# Patient Record
Sex: FEMALE | Race: WHITE | NOT HISPANIC OR LATINO | Employment: FULL TIME | URBAN - METROPOLITAN AREA
[De-identification: names, ages, dates, MRNs, and addresses within clinical notes are randomized per-mention and may not be internally consistent; named-entity substitution may affect disease eponyms.]

---

## 2018-06-11 ENCOUNTER — TRANSCRIBE ORDERS (OUTPATIENT)
Dept: ADMINISTRATIVE | Facility: HOSPITAL | Age: 48
End: 2018-06-11

## 2018-06-11 DIAGNOSIS — Z12.31 VISIT FOR SCREENING MAMMOGRAM: Primary | ICD-10-CM

## 2018-06-22 ENCOUNTER — HOSPITAL ENCOUNTER (OUTPATIENT)
Dept: MAMMOGRAPHY | Facility: HOSPITAL | Age: 48
Discharge: HOME | End: 2018-06-22
Admitting: NURSE PRACTITIONER

## 2018-06-22 DIAGNOSIS — Z12.31 VISIT FOR SCREENING MAMMOGRAM: ICD-10-CM

## 2018-06-22 PROCEDURE — 77067 SCR MAMMO BI INCL CAD: CPT | Performed by: RADIOLOGY

## 2018-06-22 PROCEDURE — 77063 BREAST TOMOSYNTHESIS BI: CPT

## 2018-06-22 PROCEDURE — 77063 BREAST TOMOSYNTHESIS BI: CPT | Performed by: RADIOLOGY

## 2018-06-22 PROCEDURE — 77067 SCR MAMMO BI INCL CAD: CPT

## 2019-11-13 ENCOUNTER — HOSPITAL ENCOUNTER (OUTPATIENT)
Dept: GENERAL RADIOLOGY | Facility: HOSPITAL | Age: 49
Discharge: HOME | End: 2019-11-13
Admitting: PHYSICIAN ASSISTANT

## 2019-11-13 ENCOUNTER — TRANSCRIBE ORDERS (OUTPATIENT)
Dept: ADMINISTRATIVE | Facility: HOSPITAL | Age: 49
End: 2019-11-13

## 2019-11-13 DIAGNOSIS — M79.671 PAIN OF MIDFOOT, RIGHT: Primary | ICD-10-CM

## 2019-11-13 PROCEDURE — 73630 X-RAY EXAM OF FOOT: CPT

## 2020-02-03 ENCOUNTER — TRANSCRIBE ORDERS (OUTPATIENT)
Dept: ADMINISTRATIVE | Facility: HOSPITAL | Age: 50
End: 2020-02-03

## 2020-02-03 ENCOUNTER — APPOINTMENT (OUTPATIENT)
Dept: GENERAL RADIOLOGY | Facility: HOSPITAL | Age: 50
End: 2020-02-03

## 2020-02-03 DIAGNOSIS — M54.9 BACK PAIN, UNSPECIFIED BACK LOCATION, UNSPECIFIED BACK PAIN LATERALITY, UNSPECIFIED CHRONICITY: Primary | ICD-10-CM

## 2020-08-20 ENCOUNTER — OFFICE VISIT (OUTPATIENT)
Dept: NEUROSURGERY | Facility: CLINIC | Age: 50
End: 2020-08-20

## 2020-08-20 ENCOUNTER — PREP FOR SURGERY (OUTPATIENT)
Dept: OTHER | Facility: HOSPITAL | Age: 50
End: 2020-08-20

## 2020-08-20 VITALS — TEMPERATURE: 98 F | BODY MASS INDEX: 31.2 KG/M2 | HEIGHT: 67 IN | RESPIRATION RATE: 15 BRPM | WEIGHT: 198.8 LBS

## 2020-08-20 DIAGNOSIS — M51.16 LUMBAR DISC HERNIATION WITH RADICULOPATHY: Primary | ICD-10-CM

## 2020-08-20 DIAGNOSIS — M54.32 SCIATICA OF LEFT SIDE: Primary | ICD-10-CM

## 2020-08-20 DIAGNOSIS — M47.819 FACET ARTHROPATHY: ICD-10-CM

## 2020-08-20 DIAGNOSIS — M51.36 DDD (DEGENERATIVE DISC DISEASE), LUMBAR: ICD-10-CM

## 2020-08-20 PROBLEM — G43.019 COMMON MIGRAINE WITH INTRACTABLE MIGRAINE: Status: ACTIVE | Noted: 2020-08-20

## 2020-08-20 PROBLEM — R51.9 BENIGN HEADACHE: Status: ACTIVE | Noted: 2020-08-20

## 2020-08-20 PROBLEM — G47.00 CANNOT SLEEP: Status: ACTIVE | Noted: 2020-08-20

## 2020-08-20 PROCEDURE — 99204 OFFICE O/P NEW MOD 45 MIN: CPT | Performed by: NEUROLOGICAL SURGERY

## 2020-08-20 RX ORDER — ACETAMINOPHEN 325 MG/1
650 TABLET ORAL ONCE
Status: CANCELLED | OUTPATIENT
Start: 2020-08-20 | End: 2020-08-20

## 2020-08-20 RX ORDER — BUSPIRONE HYDROCHLORIDE 5 MG/1
5 TABLET ORAL 3 TIMES DAILY
COMMUNITY

## 2020-08-20 RX ORDER — CHLORHEXIDINE GLUCONATE 4 G/100ML
SOLUTION TOPICAL
Qty: 120 ML | Refills: 0 | Status: ON HOLD | OUTPATIENT
Start: 2020-08-20 | End: 2020-09-23

## 2020-08-20 RX ORDER — FAMOTIDINE 20 MG/1
20 TABLET, FILM COATED ORAL
Status: CANCELLED | OUTPATIENT
Start: 2020-08-20

## 2020-08-20 RX ORDER — IBUPROFEN 800 MG/1
800 TABLET ORAL ONCE
Status: CANCELLED | OUTPATIENT
Start: 2020-08-20 | End: 2020-08-20

## 2020-08-20 RX ORDER — SODIUM CHLORIDE, SODIUM LACTATE, POTASSIUM CHLORIDE, CALCIUM CHLORIDE 600; 310; 30; 20 MG/100ML; MG/100ML; MG/100ML; MG/100ML
9 INJECTION, SOLUTION INTRAVENOUS CONTINUOUS
Status: CANCELLED | OUTPATIENT
Start: 2020-08-20

## 2020-08-20 RX ORDER — TEMAZEPAM 15 MG/1
15 CAPSULE ORAL NIGHTLY PRN
COMMUNITY

## 2020-08-20 RX ORDER — LAMOTRIGINE 200 MG/1
200 TABLET ORAL DAILY
COMMUNITY

## 2020-08-20 RX ORDER — GABAPENTIN 100 MG/1
100 CAPSULE ORAL DAILY
COMMUNITY
End: 2020-08-24

## 2020-08-20 RX ORDER — LEVOTHYROXINE SODIUM 0.15 MG/1
125 TABLET ORAL DAILY
COMMUNITY
Start: 2014-07-07 | End: 2022-10-04

## 2020-08-20 RX ORDER — CEFAZOLIN SODIUM 2 G/100ML
2 INJECTION, SOLUTION INTRAVENOUS ONCE
Status: CANCELLED | OUTPATIENT
Start: 2020-08-20 | End: 2020-08-20

## 2020-08-20 RX ORDER — BUPROPION HYDROCHLORIDE 300 MG/1
300 TABLET ORAL EVERY MORNING
COMMUNITY
Start: 2014-01-03 | End: 2022-10-04

## 2020-08-20 RX ORDER — HYDROCODONE BITARTRATE AND ACETAMINOPHEN 7.5; 325 MG/1; MG/1
1 TABLET ORAL ONCE
Status: CANCELLED | OUTPATIENT
Start: 2020-08-20 | End: 2020-08-20

## 2020-08-20 RX ORDER — LURASIDONE HYDROCHLORIDE 20 MG/1
20 TABLET, FILM COATED ORAL DAILY
COMMUNITY

## 2020-08-20 NOTE — PROGRESS NOTES
NAME: DALJIT ROMERO   DOS: 2020  : 1970  PCP: Gary Bowie MD    Chief Complaint:    Chief Complaint   Patient presents with   • Low back & left leg pain       History of Present Illness:  49 y.o. female   Is a 49-year-old female in neurosurgical consultation she reports really no issues of prior back pain she is relatively healthy she recently started as a schoolteacher at [employer] and since January of this year she began with insidious back and left leg pain is gotten worse if anything is been plaguing her incessantly she is tried multiple therapies at home ibuprofen and has pain that radiates to the proximal SI joint down the left leg to the anterior shin consistent with L5 radiculitis she denies any other additional weakness or anything on the right leg she continues to work and she is here for evaluation she does have a history of migraines    PMHX  Allergies:  Allergies   Allergen Reactions   • Neosporin [Bacitracin-Polymyxin B] Hives and Swelling     Medications    Current Outpatient Medications:   •  buPROPion XL (WELLBUTRIN XL) 300 MG 24 hr tablet, Take  by mouth., Disp: , Rfl:   •  busPIRone (BUSPAR) 5 MG tablet, Take 5 mg by mouth 3 (Three) Times a Day., Disp: , Rfl:   •  gabapentin (NEURONTIN) 100 MG capsule, Take 100 mg by mouth Daily., Disp: , Rfl:   •  lamoTRIgine (LaMICtal) 200 MG tablet, Take 200 mg by mouth Daily., Disp: , Rfl:   •  levothyroxine (SYNTHROID, LEVOTHROID) 125 MCG tablet, Take  by mouth., Disp: , Rfl:   •  Lurasidone HCl (Latuda) 20 MG tablet tablet, Take 20 mg by mouth Daily., Disp: , Rfl:   •  temazepam (RESTORIL) 15 MG capsule, Take 15 mg by mouth At Night As Needed for Sleep., Disp: , Rfl:   Past Medical History:  History reviewed. No pertinent past medical history.  Past Surgical History:  Past Surgical History:   Procedure Laterality Date   •  SECTION       Social Hx:  Social History     Tobacco Use   • Smoking status: Never Smoker   • Smokeless  tobacco: Never Used   Substance Use Topics   • Alcohol use: Yes   • Drug use: Never     Family Hx:  Family History   Problem Relation Age of Onset   • Breast cancer Paternal Grandmother 60   • Ovarian cancer Neg Hx      Review of Systems:        Review of Systems   Constitutional: Negative for activity change, appetite change, chills, diaphoresis, fatigue, fever and unexpected weight change.   HENT: Negative for congestion, dental problem, drooling, ear discharge, ear pain, facial swelling, hearing loss, mouth sores, nosebleeds, postnasal drip, rhinorrhea, sinus pressure, sneezing, sore throat, tinnitus, trouble swallowing and voice change.    Eyes: Negative for photophobia, pain, discharge, redness, itching and visual disturbance.   Respiratory: Negative for apnea, cough, choking, chest tightness, shortness of breath, wheezing and stridor.    Cardiovascular: Negative for chest pain, palpitations and leg swelling.   Gastrointestinal: Negative for abdominal distention, abdominal pain, anal bleeding, blood in stool, constipation, diarrhea, nausea, rectal pain and vomiting.   Endocrine: Negative for cold intolerance, heat intolerance, polydipsia, polyphagia and polyuria.   Genitourinary: Negative for decreased urine volume, difficulty urinating, dysuria, enuresis, flank pain, frequency, genital sores, hematuria and urgency.   Musculoskeletal: Positive for back pain. Negative for arthralgias, gait problem, joint swelling, myalgias, neck pain and neck stiffness.   Skin: Negative for color change, pallor, rash and wound.   Allergic/Immunologic: Negative for environmental allergies, food allergies and immunocompromised state.   Neurological: Negative for dizziness, tremors, seizures, syncope, facial asymmetry, speech difficulty, weakness, light-headedness, numbness and headaches.   Hematological: Negative for adenopathy. Does not bruise/bleed easily.   Psychiatric/Behavioral: Negative for agitation, behavioral problems,  confusion, decreased concentration, dysphoric mood, hallucinations, self-injury, sleep disturbance and suicidal ideas. The patient is not nervous/anxious and is not hyperactive.    All other systems reviewed and are negative.     I have reviewed this note template and all pertinent parts of the review of systems social, family history, surgical history and medication list      Physical Examination:  Vitals:    08/20/20 1343   Resp: 15   Temp: 98 °F (36.7 °C)      General Appearance:   Well developed, well nourished, well groomed, alert, and cooperative.  Neurological examination:  Neurologic Exam  Vital signs were reviewed and documented in the chart  Patient appeared in good neurologic function with normal comprehension fluent speech  Mood and affect are normal  Sense of smell deferred    Pupils symmetric equally reactive funduscopic exam not visualized   Visual fields intact to confrontation  Extraocular movements intact  Face motor function is symmetric  Facial sensations normal  Hearing intact to finger rub hearing intact to finger rub  Tongue is midline  Palate symmetric  Swallowing normal  Shoulder shrug normal    Muscle bulk and tone normal  5 out of 5 strength no motor drift  Gait normal intact  Negative Romberg  No clonus long tract signs or myelopathy    Reflexes symmetric  No edema noted and extremities skin appears normal    Straight leg raise sign absent on the right positive on the left  No signs of intrinsic hip dysfunction  Back is without any lesions or abnormality  Feet are warm and well perfused        Review of Imaging/DATA:  I reviewed an MRI of her lumbar spine she is got multilevel degenerative disc but interestingly at L4-5 on the left she has an inferiorly extruded sequestered disc fragment there may be a far lateral component  Diagnoses/Plan:    Ms. Howe is a 49 y.o. 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 given the fact she really has not improved that much since January than February and the clear concordance of her symptoms I recommended a left L4-5 lumbar discectomy I explained the results of surgery failure rate need for other procedures etc.    I think she will do well with an LEFT L4-5 lumbar discectomy for inferior disc extrusion

## 2020-08-21 PROBLEM — M54.32 SCIATICA OF LEFT SIDE: Status: ACTIVE | Noted: 2020-08-21

## 2020-08-24 ENCOUNTER — APPOINTMENT (OUTPATIENT)
Dept: PREADMISSION TESTING | Facility: HOSPITAL | Age: 50
End: 2020-08-24

## 2020-08-24 VITALS — BODY MASS INDEX: 31.8 KG/M2 | WEIGHT: 202.6 LBS | HEIGHT: 67 IN

## 2020-08-24 DIAGNOSIS — M54.32 SCIATICA OF LEFT SIDE: ICD-10-CM

## 2020-08-24 LAB
ANION GAP SERPL CALCULATED.3IONS-SCNC: 9 MMOL/L (ref 5–15)
BUN SERPL-MCNC: 11 MG/DL (ref 6–20)
BUN/CREAT SERPL: 10.3 (ref 7–25)
CALCIUM SPEC-SCNC: 9.4 MG/DL (ref 8.6–10.5)
CHLORIDE SERPL-SCNC: 102 MMOL/L (ref 98–107)
CO2 SERPL-SCNC: 29 MMOL/L (ref 22–29)
CREAT SERPL-MCNC: 1.07 MG/DL (ref 0.57–1)
DEPRECATED RDW RBC AUTO: 39 FL (ref 37–54)
ERYTHROCYTE [DISTWIDTH] IN BLOOD BY AUTOMATED COUNT: 11.5 % (ref 12.3–15.4)
GFR SERPL CREATININE-BSD FRML MDRD: 55 ML/MIN/1.73
GLUCOSE SERPL-MCNC: 85 MG/DL (ref 65–99)
HCT VFR BLD AUTO: 41.7 % (ref 34–46.6)
HGB BLD-MCNC: 13.3 G/DL (ref 12–15.9)
MCH RBC QN AUTO: 29.7 PG (ref 26.6–33)
MCHC RBC AUTO-ENTMCNC: 31.9 G/DL (ref 31.5–35.7)
MCV RBC AUTO: 93.1 FL (ref 79–97)
MRSA DNA SPEC QL NAA+PROBE: NEGATIVE
PLATELET # BLD AUTO: 254 10*3/MM3 (ref 140–450)
PMV BLD AUTO: 9.8 FL (ref 6–12)
POTASSIUM SERPL-SCNC: 4.2 MMOL/L (ref 3.5–5.2)
RBC # BLD AUTO: 4.48 10*6/MM3 (ref 3.77–5.28)
SODIUM SERPL-SCNC: 140 MMOL/L (ref 136–145)
WBC # BLD AUTO: 5.47 10*3/MM3 (ref 3.4–10.8)

## 2020-08-24 PROCEDURE — U0002 COVID-19 LAB TEST NON-CDC: HCPCS

## 2020-08-24 PROCEDURE — C9803 HOPD COVID-19 SPEC COLLECT: HCPCS

## 2020-08-24 PROCEDURE — U0004 COV-19 TEST NON-CDC HGH THRU: HCPCS

## 2020-08-24 PROCEDURE — 87641 MR-STAPH DNA AMP PROBE: CPT | Performed by: NEUROLOGICAL SURGERY

## 2020-08-24 PROCEDURE — 85027 COMPLETE CBC AUTOMATED: CPT | Performed by: NEUROLOGICAL SURGERY

## 2020-08-24 PROCEDURE — 36415 COLL VENOUS BLD VENIPUNCTURE: CPT

## 2020-08-24 PROCEDURE — 80048 BASIC METABOLIC PNL TOTAL CA: CPT | Performed by: NEUROLOGICAL SURGERY

## 2020-08-24 RX ORDER — SUMATRIPTAN 50 MG/1
50 TABLET, FILM COATED ORAL ONCE AS NEEDED
COMMUNITY

## 2020-08-24 RX ORDER — LANSOPRAZOLE 30 MG/1
1 CAPSULE, DELAYED RELEASE ORAL DAILY
COMMUNITY
Start: 2020-05-22

## 2020-08-24 RX ORDER — ERGOCALCIFEROL 1.25 MG/1
1 CAPSULE ORAL WEEKLY
COMMUNITY
Start: 2020-05-13 | End: 2022-10-04

## 2020-08-24 RX ORDER — DICLOFENAC POTASSIUM 50 MG/1
1 POWDER, FOR SOLUTION ORAL AS NEEDED
COMMUNITY
Start: 2020-05-26 | End: 2022-10-04

## 2020-08-24 RX ORDER — MEDROXYPROGESTERONE ACETATE 10 MG/1
10 TABLET ORAL DAILY
COMMUNITY
End: 2021-03-29 | Stop reason: SDUPTHER

## 2020-08-24 RX ORDER — ACETAMINOPHEN 500 MG
1000 TABLET ORAL EVERY 6 HOURS PRN
COMMUNITY

## 2020-08-25 ENCOUNTER — ANESTHESIA EVENT (OUTPATIENT)
Dept: PERIOP | Facility: HOSPITAL | Age: 50
End: 2020-08-25

## 2020-08-25 LAB
REF LAB TEST METHOD: NORMAL
SARS-COV-2 RNA RESP QL NAA+PROBE: NOT DETECTED

## 2020-08-25 RX ORDER — SODIUM CHLORIDE, SODIUM LACTATE, POTASSIUM CHLORIDE, CALCIUM CHLORIDE 600; 310; 30; 20 MG/100ML; MG/100ML; MG/100ML; MG/100ML
9 INJECTION, SOLUTION INTRAVENOUS CONTINUOUS
Status: CANCELLED | OUTPATIENT
Start: 2020-08-25

## 2020-08-25 RX ORDER — SODIUM CHLORIDE 0.9 % (FLUSH) 0.9 %
10 SYRINGE (ML) INJECTION EVERY 12 HOURS SCHEDULED
Status: CANCELLED | OUTPATIENT
Start: 2020-08-25

## 2020-08-25 RX ORDER — SODIUM CHLORIDE 0.9 % (FLUSH) 0.9 %
10 SYRINGE (ML) INJECTION AS NEEDED
Status: CANCELLED | OUTPATIENT
Start: 2020-08-25

## 2020-08-25 RX ORDER — FAMOTIDINE 10 MG/ML
20 INJECTION, SOLUTION INTRAVENOUS ONCE
Status: CANCELLED | OUTPATIENT
Start: 2020-08-25 | End: 2020-08-25

## 2020-08-26 ENCOUNTER — APPOINTMENT (OUTPATIENT)
Dept: GENERAL RADIOLOGY | Facility: HOSPITAL | Age: 50
End: 2020-08-26

## 2020-08-26 ENCOUNTER — ANESTHESIA (OUTPATIENT)
Dept: PERIOP | Facility: HOSPITAL | Age: 50
End: 2020-08-26

## 2020-08-26 ENCOUNTER — HOSPITAL ENCOUNTER (OUTPATIENT)
Facility: HOSPITAL | Age: 50
Setting detail: HOSPITAL OUTPATIENT SURGERY
Discharge: HOME | End: 2020-08-26
Attending: NEUROLOGICAL SURGERY | Admitting: ANESTHESIOLOGY

## 2020-08-26 VITALS
SYSTOLIC BLOOD PRESSURE: 114 MMHG | OXYGEN SATURATION: 95 % | HEART RATE: 82 BPM | DIASTOLIC BLOOD PRESSURE: 78 MMHG | RESPIRATION RATE: 18 BRPM | TEMPERATURE: 97.6 F

## 2020-08-26 LAB
B-HCG UR QL: NEGATIVE
INTERNAL NEGATIVE CONTROL: NEGATIVE
INTERNAL POSITIVE CONTROL: POSITIVE
Lab: NORMAL

## 2020-08-26 PROCEDURE — 25010000002 FENTANYL CITRATE (PF) 100 MCG/2ML SOLUTION: Performed by: NURSE ANESTHETIST, CERTIFIED REGISTERED

## 2020-08-26 PROCEDURE — 63030 LAMOT DCMPRN NRV RT 1 LMBR: CPT | Performed by: PHYSICIAN ASSISTANT

## 2020-08-26 PROCEDURE — 25010000002 METHYLPREDNISOLONE PER 40 MG: Performed by: NEUROLOGICAL SURGERY

## 2020-08-26 PROCEDURE — 81025 URINE PREGNANCY TEST: CPT | Performed by: ANESTHESIOLOGY

## 2020-08-26 PROCEDURE — 76000 FLUOROSCOPY <1 HR PHYS/QHP: CPT

## 2020-08-26 PROCEDURE — 25010000002 ONDANSETRON PER 1 MG: Performed by: NURSE ANESTHETIST, CERTIFIED REGISTERED

## 2020-08-26 PROCEDURE — 25010000002 DEXAMETHASONE PER 1 MG: Performed by: NURSE ANESTHETIST, CERTIFIED REGISTERED

## 2020-08-26 PROCEDURE — 63030 LAMOT DCMPRN NRV RT 1 LMBR: CPT | Performed by: NEUROLOGICAL SURGERY

## 2020-08-26 PROCEDURE — 25010000002 NEOSTIGMINE 10 MG/10ML SOLUTION: Performed by: NURSE ANESTHETIST, CERTIFIED REGISTERED

## 2020-08-26 PROCEDURE — 25010000003 CEFAZOLIN PER 500 MG: Performed by: NEUROLOGICAL SURGERY

## 2020-08-26 PROCEDURE — 25010000002 PROPOFOL 10 MG/ML EMULSION: Performed by: NURSE ANESTHETIST, CERTIFIED REGISTERED

## 2020-08-26 PROCEDURE — 25010000003 CEFAZOLIN IN DEXTROSE 2-4 GM/100ML-% SOLUTION: Performed by: PHYSICIAN ASSISTANT

## 2020-08-26 PROCEDURE — 25010000002 PHENYLEPHRINE PER 1 ML: Performed by: NURSE ANESTHETIST, CERTIFIED REGISTERED

## 2020-08-26 PROCEDURE — 25010000003 LIDOCAINE 1 % SOLUTION: Performed by: NURSE ANESTHETIST, CERTIFIED REGISTERED

## 2020-08-26 DEVICE — FLOSEAL HEMOSTATIC MATRIX, 10ML
Type: IMPLANTABLE DEVICE | Status: FUNCTIONAL
Brand: FLOSEAL HEMOSTATIC MATRIX

## 2020-08-26 RX ORDER — HYDROCODONE BITARTRATE AND ACETAMINOPHEN 7.5; 325 MG/1; MG/1
1 TABLET ORAL ONCE
Status: COMPLETED | OUTPATIENT
Start: 2020-08-26 | End: 2020-08-26

## 2020-08-26 RX ORDER — SODIUM CHLORIDE 9 MG/ML
9 INJECTION, SOLUTION INTRAVENOUS CONTINUOUS
Status: DISCONTINUED | OUTPATIENT
Start: 2020-08-26 | End: 2020-08-26 | Stop reason: HOSPADM

## 2020-08-26 RX ORDER — SODIUM CHLORIDE, SODIUM LACTATE, POTASSIUM CHLORIDE, CALCIUM CHLORIDE 600; 310; 30; 20 MG/100ML; MG/100ML; MG/100ML; MG/100ML
9 INJECTION, SOLUTION INTRAVENOUS CONTINUOUS
Status: DISCONTINUED | OUTPATIENT
Start: 2020-08-26 | End: 2020-08-26

## 2020-08-26 RX ORDER — HYDROCODONE BITARTRATE AND ACETAMINOPHEN 5; 325 MG/1; MG/1
1 TABLET ORAL ONCE AS NEEDED
Status: DISCONTINUED | OUTPATIENT
Start: 2020-08-26 | End: 2020-08-26 | Stop reason: HOSPADM

## 2020-08-26 RX ORDER — ONDANSETRON 2 MG/ML
4 INJECTION INTRAMUSCULAR; INTRAVENOUS ONCE AS NEEDED
Status: DISCONTINUED | OUTPATIENT
Start: 2020-08-26 | End: 2020-08-26 | Stop reason: HOSPADM

## 2020-08-26 RX ORDER — PROPOFOL 10 MG/ML
VIAL (ML) INTRAVENOUS AS NEEDED
Status: DISCONTINUED | OUTPATIENT
Start: 2020-08-26 | End: 2020-08-26 | Stop reason: SURG

## 2020-08-26 RX ORDER — LIDOCAINE HYDROCHLORIDE 10 MG/ML
INJECTION, SOLUTION INFILTRATION; PERINEURAL AS NEEDED
Status: DISCONTINUED | OUTPATIENT
Start: 2020-08-26 | End: 2020-08-26 | Stop reason: SURG

## 2020-08-26 RX ORDER — PROMETHAZINE HYDROCHLORIDE 25 MG/1
25 TABLET ORAL ONCE AS NEEDED
Status: DISCONTINUED | OUTPATIENT
Start: 2020-08-26 | End: 2020-08-26 | Stop reason: HOSPADM

## 2020-08-26 RX ORDER — PROMETHAZINE HYDROCHLORIDE 25 MG/1
25 SUPPOSITORY RECTAL ONCE AS NEEDED
Status: DISCONTINUED | OUTPATIENT
Start: 2020-08-26 | End: 2020-08-26 | Stop reason: HOSPADM

## 2020-08-26 RX ORDER — FAMOTIDINE 20 MG/1
20 TABLET, FILM COATED ORAL ONCE
Status: COMPLETED | OUTPATIENT
Start: 2020-08-26 | End: 2020-08-26

## 2020-08-26 RX ORDER — BUPIVACAINE HYDROCHLORIDE 2.5 MG/ML
INJECTION, SOLUTION EPIDURAL; INFILTRATION; INTRACAUDAL AS NEEDED
Status: DISCONTINUED | OUTPATIENT
Start: 2020-08-26 | End: 2020-08-26 | Stop reason: HOSPADM

## 2020-08-26 RX ORDER — HYDROMORPHONE HYDROCHLORIDE 1 MG/ML
0.5 INJECTION, SOLUTION INTRAMUSCULAR; INTRAVENOUS; SUBCUTANEOUS
Status: DISCONTINUED | OUTPATIENT
Start: 2020-08-26 | End: 2020-08-26 | Stop reason: HOSPADM

## 2020-08-26 RX ORDER — MAGNESIUM HYDROXIDE 1200 MG/15ML
LIQUID ORAL AS NEEDED
Status: DISCONTINUED | OUTPATIENT
Start: 2020-08-26 | End: 2020-08-26 | Stop reason: HOSPADM

## 2020-08-26 RX ORDER — FENTANYL CITRATE 50 UG/ML
50 INJECTION, SOLUTION INTRAMUSCULAR; INTRAVENOUS
Status: DISCONTINUED | OUTPATIENT
Start: 2020-08-26 | End: 2020-08-26 | Stop reason: HOSPADM

## 2020-08-26 RX ORDER — ONDANSETRON 2 MG/ML
INJECTION INTRAMUSCULAR; INTRAVENOUS AS NEEDED
Status: DISCONTINUED | OUTPATIENT
Start: 2020-08-26 | End: 2020-08-26 | Stop reason: SURG

## 2020-08-26 RX ORDER — GLYCOPYRROLATE 0.2 MG/ML
INJECTION INTRAMUSCULAR; INTRAVENOUS AS NEEDED
Status: DISCONTINUED | OUTPATIENT
Start: 2020-08-26 | End: 2020-08-26 | Stop reason: SURG

## 2020-08-26 RX ORDER — IBUPROFEN 800 MG/1
800 TABLET ORAL ONCE
Status: COMPLETED | OUTPATIENT
Start: 2020-08-26 | End: 2020-08-26

## 2020-08-26 RX ORDER — DEXAMETHASONE SODIUM PHOSPHATE 4 MG/ML
INJECTION, SOLUTION INTRA-ARTICULAR; INTRALESIONAL; INTRAMUSCULAR; INTRAVENOUS; SOFT TISSUE AS NEEDED
Status: DISCONTINUED | OUTPATIENT
Start: 2020-08-26 | End: 2020-08-26 | Stop reason: SURG

## 2020-08-26 RX ORDER — LIDOCAINE HYDROCHLORIDE 10 MG/ML
0.5 INJECTION, SOLUTION EPIDURAL; INFILTRATION; INTRACAUDAL; PERINEURAL ONCE AS NEEDED
Status: COMPLETED | OUTPATIENT
Start: 2020-08-26 | End: 2020-08-26

## 2020-08-26 RX ORDER — LIDOCAINE HYDROCHLORIDE AND EPINEPHRINE 5; 5 MG/ML; UG/ML
INJECTION, SOLUTION INFILTRATION; PERINEURAL AS NEEDED
Status: DISCONTINUED | OUTPATIENT
Start: 2020-08-26 | End: 2020-08-26 | Stop reason: HOSPADM

## 2020-08-26 RX ORDER — CEFAZOLIN SODIUM 2 G/100ML
2 INJECTION, SOLUTION INTRAVENOUS ONCE
Status: COMPLETED | OUTPATIENT
Start: 2020-08-26 | End: 2020-08-26

## 2020-08-26 RX ORDER — ACETAMINOPHEN 325 MG/1
650 TABLET ORAL ONCE
Status: COMPLETED | OUTPATIENT
Start: 2020-08-26 | End: 2020-08-26

## 2020-08-26 RX ORDER — NEOSTIGMINE METHYLSULFATE 1 MG/ML
INJECTION, SOLUTION INTRAVENOUS AS NEEDED
Status: DISCONTINUED | OUTPATIENT
Start: 2020-08-26 | End: 2020-08-26 | Stop reason: SURG

## 2020-08-26 RX ORDER — SODIUM CHLORIDE, SODIUM LACTATE, POTASSIUM CHLORIDE, CALCIUM CHLORIDE 600; 310; 30; 20 MG/100ML; MG/100ML; MG/100ML; MG/100ML
INJECTION, SOLUTION INTRAVENOUS CONTINUOUS PRN
Status: DISCONTINUED | OUTPATIENT
Start: 2020-08-26 | End: 2020-08-26 | Stop reason: SURG

## 2020-08-26 RX ORDER — ROCURONIUM BROMIDE 10 MG/ML
INJECTION, SOLUTION INTRAVENOUS AS NEEDED
Status: DISCONTINUED | OUTPATIENT
Start: 2020-08-26 | End: 2020-08-26 | Stop reason: SURG

## 2020-08-26 RX ORDER — FENTANYL CITRATE 50 UG/ML
INJECTION, SOLUTION INTRAMUSCULAR; INTRAVENOUS AS NEEDED
Status: DISCONTINUED | OUTPATIENT
Start: 2020-08-26 | End: 2020-08-26 | Stop reason: SURG

## 2020-08-26 RX ORDER — METHYLPREDNISOLONE ACETATE 40 MG/ML
INJECTION, SUSPENSION INTRA-ARTICULAR; INTRALESIONAL; INTRAMUSCULAR; SOFT TISSUE AS NEEDED
Status: DISCONTINUED | OUTPATIENT
Start: 2020-08-26 | End: 2020-08-26 | Stop reason: HOSPADM

## 2020-08-26 RX ADMIN — IBUPROFEN 800 MG: 800 TABLET, FILM COATED ORAL at 10:08

## 2020-08-26 RX ADMIN — PHENYLEPHRINE HYDROCHLORIDE 80 MCG: 10 INJECTION INTRAVENOUS at 13:54

## 2020-08-26 RX ADMIN — LIDOCAINE HYDROCHLORIDE 0.2 ML: 10 INJECTION, SOLUTION EPIDURAL; INFILTRATION; INTRACAUDAL; PERINEURAL at 10:06

## 2020-08-26 RX ADMIN — ACETAMINOPHEN 650 MG: 325 TABLET ORAL at 10:07

## 2020-08-26 RX ADMIN — PHENYLEPHRINE HYDROCHLORIDE 80 MCG: 10 INJECTION INTRAVENOUS at 13:52

## 2020-08-26 RX ADMIN — ROCURONIUM BROMIDE 40 MG: 10 INJECTION INTRAVENOUS at 13:44

## 2020-08-26 RX ADMIN — FENTANYL CITRATE 25 MCG: 50 INJECTION, SOLUTION INTRAMUSCULAR; INTRAVENOUS at 13:44

## 2020-08-26 RX ADMIN — NEOSTIGMINE 4 MG: 1 INJECTION INTRAVENOUS at 14:57

## 2020-08-26 RX ADMIN — HYDROCODONE BITARTRATE AND ACETAMINOPHEN 1 TABLET: 7.5; 325 TABLET ORAL at 10:07

## 2020-08-26 RX ADMIN — SODIUM CHLORIDE 9 ML/HR: 9 INJECTION, SOLUTION INTRAVENOUS at 10:09

## 2020-08-26 RX ADMIN — DEXAMETHASONE SODIUM PHOSPHATE 8 MG: 4 INJECTION, SOLUTION INTRAMUSCULAR; INTRAVENOUS at 13:48

## 2020-08-26 RX ADMIN — GLYCOPYRROLATE 0.6 MG: 0.2 INJECTION INTRAMUSCULAR; INTRAVENOUS at 14:57

## 2020-08-26 RX ADMIN — FENTANYL CITRATE 50 MCG: 0.05 INJECTION, SOLUTION INTRAMUSCULAR; INTRAVENOUS at 15:52

## 2020-08-26 RX ADMIN — PROPOFOL 20 MG: 10 INJECTION, EMULSION INTRAVENOUS at 13:45

## 2020-08-26 RX ADMIN — SODIUM CHLORIDE, POTASSIUM CHLORIDE, SODIUM LACTATE AND CALCIUM CHLORIDE: 600; 310; 30; 20 INJECTION, SOLUTION INTRAVENOUS at 14:57

## 2020-08-26 RX ADMIN — ONDANSETRON 4 MG: 2 INJECTION INTRAMUSCULAR; INTRAVENOUS at 14:49

## 2020-08-26 RX ADMIN — CEFAZOLIN SODIUM 2 G: 2 INJECTION, SOLUTION INTRAVENOUS at 13:35

## 2020-08-26 RX ADMIN — SODIUM CHLORIDE, POTASSIUM CHLORIDE, SODIUM LACTATE AND CALCIUM CHLORIDE: 600; 310; 30; 20 INJECTION, SOLUTION INTRAVENOUS at 13:35

## 2020-08-26 RX ADMIN — PROPOFOL 150 MG: 10 INJECTION, EMULSION INTRAVENOUS at 13:44

## 2020-08-26 RX ADMIN — LIDOCAINE HYDROCHLORIDE 50 MG: 10 INJECTION, SOLUTION INFILTRATION; PERINEURAL at 13:44

## 2020-08-26 RX ADMIN — FAMOTIDINE 20 MG: 20 TABLET, FILM COATED ORAL at 10:07

## 2020-08-26 NOTE — ANESTHESIA POSTPROCEDURE EVALUATION
Patient: Soumya Howe    Procedure Summary     Date:  08/26/20 Room / [facility]:   [facility]    Anesthesia Start:  1335 Anesthesia Stop:  1513    Procedure:  lumbar MICROdiscectomy LEFT L4-5 (Left Spine Lumbar) Diagnosis:       Sciatica of left side      (Sciatica of left side [M54.32])    Surgeon:  Mamadou Estrada MD Provider:  Sam Perry MD    Anesthesia Type:  general ASA Status:  2          Anesthesia Type: general    Vitals  Vitals Value Taken Time   /71 8/26/2020  3:13 PM   Temp 97 °F (36.1 °C) 8/26/2020  3:13 PM   Pulse 96 8/26/2020  3:13 PM   Resp 14 8/26/2020  3:13 PM   SpO2 100 % 8/26/2020  3:13 PM           Post Anesthesia Care and Evaluation    Patient location during evaluation: PACU  Patient participation: complete - patient participated  Level of consciousness: awake and alert  Pain score: 0  Pain management: adequate  Airway patency: patent  Anesthetic complications: No anesthetic complications  PONV Status: none  Cardiovascular status: hemodynamically stable and acceptable  Respiratory status: nonlabored ventilation, acceptable and nasal cannula  Hydration status: acceptable    Comments: Pt transported to PACU with O2 via nasal cannula at 4 L/MIN. Vital signs stable.  Pt shows no signs of distress.  Report given to PACU RN. /71 (BP Location: Right arm)   Pulse 96   Temp 97 °F (36.1 °C) (Temporal)   Resp 14   SpO2 100%   Breastfeeding No Comment: neg Hcg

## 2020-08-26 NOTE — ANESTHESIA PREPROCEDURE EVALUATION
Anesthesia Evaluation     Patient summary reviewed and Nursing notes reviewed   NPO Solid Status: > 8 hours  NPO Liquid Status: > 4 hours           Airway   Mallampati: I  TM distance: >3 FB  Neck ROM: full  Dental      Pulmonary    Cardiovascular     ECG reviewed  Rhythm: regular  Rate: normal        Neuro/Psych  (+) headaches,     GI/Hepatic/Renal/Endo    (+) obesity,       Musculoskeletal     Abdominal    Substance History      OB/GYN          Other        ROS/Med Hx Other: S/p endeometrial ablation                  Anesthesia Plan    ASA 2     general     intravenous induction     Anesthetic plan, all risks, benefits, and alternatives have been provided, discussed and informed consent has been obtained with: patient.    Plan discussed with CRNA.

## 2020-08-26 NOTE — ANESTHESIA PROCEDURE NOTES
Airway  Urgency: elective    Date/Time: 8/26/2020 1:46 PM  Airway not difficult    General Information and Staff    Patient location during procedure: OR  CRNA: Laura Fung CRNA    Indications and Patient Condition  Indications for airway management: airway protection    Preoxygenated: yes  MILS not maintained throughout  Mask difficulty assessment: 1 - vent by mask    Final Airway Details  Final airway type: endotracheal airway      Successful airway: ETT  Cuffed: yes   Successful intubation technique: direct laryngoscopy  Facilitating devices/methods: intubating stylet  Endotracheal tube insertion site: oral  Blade: Gustavo  Blade size: 3  ETT size (mm): 7.0  Cormack-Lehane Classification: grade I - full view of glottis  Placement verified by: chest auscultation and capnometry   Cuff volume (mL): 6  Measured from: teeth  ETT/EBT  to teeth (cm): 21  Number of attempts at approach: 1  Assessment: lips, teeth, and gum same as pre-op and atraumatic intubation    Additional Comments  Smooth IV induction.  Atraumatic ET intubation.  Dentition unchanged.  Negative epigastric sounds, Breath sound equal bilaterally with symmetric chest rise and fall

## 2020-09-15 ENCOUNTER — TELEPHONE (OUTPATIENT)
Dept: NEUROSURGERY | Facility: CLINIC | Age: 50
End: 2020-09-15

## 2020-09-15 RX ORDER — METHYLPREDNISOLONE 4 MG/1
TABLET ORAL
Qty: 21 TABLET | Refills: 0 | Status: SHIPPED | OUTPATIENT
Start: 2020-09-15 | End: 2020-09-22

## 2020-09-15 NOTE — TELEPHONE ENCOUNTER
Patient called in stating that she was doing very well since the surgery, noting all symptoms had resolved.  2 days ago she was sitting on the floor and reaching, and about 1 hour later she started experiencing left leg radicular pain that was similar to prior to surgery.  She states that sitting walking upstairs exacerbates her symptoms.  She notes that she has taken a Norco left over from surgery, Tylenol, ibuprofen, heat and ice without relief.  Therefore, I have called and patient a Medrol Dosepak and have restarted her on gabapentin.  I have notified patient.  She will follow-up on Thursday with Frandy in office.

## 2020-09-17 ENCOUNTER — OFFICE VISIT (OUTPATIENT)
Dept: NEUROSURGERY | Facility: CLINIC | Age: 50
End: 2020-09-17

## 2020-09-17 VITALS — RESPIRATION RATE: 15 BRPM | HEIGHT: 67 IN | TEMPERATURE: 97.5 F | WEIGHT: 202.6 LBS | BODY MASS INDEX: 31.8 KG/M2

## 2020-09-17 DIAGNOSIS — M51.16 LUMBAR DISC HERNIATION WITH RADICULOPATHY: Primary | ICD-10-CM

## 2020-09-17 DIAGNOSIS — M51.36 DDD (DEGENERATIVE DISC DISEASE), LUMBAR: Primary | ICD-10-CM

## 2020-09-17 PROCEDURE — 99024 POSTOP FOLLOW-UP VISIT: CPT | Performed by: PHYSICIAN ASSISTANT

## 2020-09-17 RX ORDER — GABAPENTIN 300 MG/1
300 CAPSULE ORAL 3 TIMES DAILY
Qty: 90 CAPSULE | Refills: 1 | OUTPATIENT
Start: 2020-09-17 | End: 2022-10-04

## 2020-09-17 NOTE — PROGRESS NOTES
"Subjective   Patient ID: Soumya Howe is a 49 y.o. female is here today for follow-up for wound check.    HPI:      Patient did fantastic postop and only took 4 days of pain medicine.  Patient had a left-sided L4-5 microdiscectomy on 8/26/2020.  Patient about 3 days ago sitting on the floor reaching and sorting some piles and went to walk her dog and noticed she had exquisite low back and left leg pain that was similar to prior before surgery.    Patient was started on Medrol Dosepak gabapentin was increased.    Patient comes today which is day 3 of the new pain and she states that she is \"dragging\" her foot.  Patient notices a little bit of weakness in her left foot.    Patient's incision is clean dry no signs infection bleeding erythema.  Patient denies any fever chills or signs of infection    The following portions of the patient's history were reviewed and updated as appropriate: allergies, current medications, past family history, past medical history, past social history, past surgical history and problem list.    Review of Systems   Constitutional: Negative for activity change, appetite change, chills, diaphoresis, fatigue, fever and unexpected weight change.   HENT: Negative for congestion, dental problem, drooling, ear discharge, ear pain, facial swelling, hearing loss, mouth sores, nosebleeds, postnasal drip, rhinorrhea, sinus pressure, sneezing, sore throat, tinnitus, trouble swallowing and voice change.    Eyes: Negative for photophobia, pain, discharge, redness, itching and visual disturbance.   Respiratory: Negative for apnea, cough, choking, chest tightness, shortness of breath, wheezing and stridor.    Cardiovascular: Negative for chest pain, palpitations and leg swelling.   Gastrointestinal: Negative for abdominal distention, abdominal pain, anal bleeding, blood in stool, constipation, diarrhea, nausea, rectal pain and vomiting.   Endocrine: Negative for cold intolerance, heat intolerance, " "polydipsia, polyphagia and polyuria.   Genitourinary: Negative for decreased urine volume, difficulty urinating, dysuria, enuresis, flank pain, frequency, genital sores, hematuria and urgency.   Musculoskeletal: Positive for myalgias (Left leg pain). Negative for arthralgias, back pain, gait problem, joint swelling, neck pain and neck stiffness.   Skin: Negative for color change, pallor, rash and wound.   Allergic/Immunologic: Negative for environmental allergies, food allergies and immunocompromised state.   Neurological: Negative for dizziness, tremors, seizures, syncope, facial asymmetry, speech difficulty, weakness, light-headedness, numbness and headaches.   Hematological: Negative for adenopathy. Does not bruise/bleed easily.   Psychiatric/Behavioral: Negative for agitation, behavioral problems, confusion, decreased concentration, dysphoric mood, hallucinations, self-injury, sleep disturbance and suicidal ideas. The patient is not nervous/anxious and is not hyperactive.    All other systems reviewed and are negative.        Objective    reports that she has never smoked. She has never used smokeless tobacco. She reports previous alcohol use. She reports that she does not use drugs.   SMOKING STATUS: Non-smoker    Physical Exam:   Vitals:Temp 97.5 °F (36.4 °C) (Infrared)   Resp 15   Ht 170.2 cm (67\")   Wt 91.9 kg (202 lb 9.6 oz)   BMI 31.73 kg/m²    BMI: Body mass index is 31.73 kg/m².       Incision:   The incision is well-healed and well approximated.  No signs of infection, bleeding, or erythema.    Musculoskeletal:                                            Dorsiflexion is 5 out of 5 bilaterally, however her great toe is weak on the left and left foot eversion is weaker than right                      Plantarflexion is 5/5 bilaterally                    Hip Flexion 5/5 bilaterally.                        Neurologic:                                         The patient is alert and oriented by 3.          "              Sensation is equal bilaterally with no deficit.                        Reflexes:  2+ throughout     Assessment/Plan   Independent Review of Radiographic Studies:    No new films reviewed at this visi  Medical Decision Making:    To the acute worsening of her pain and increased foot weakness we are going to get a early repeat MRI.  Patient is already on the Medrol Dosepak we will call her on Monday to check her progress.  Patient understands that we are on call this weekend if there are any issues she should call and we will sorted out.  Patient may need a reexploration of the nerve root considering she did not have any foot weakness prior to the surgery.    I will call her on Monday.  We will get the MRI and we will see her back on Thursday to evaluate the strength of her foot.    Patient's Body mass index is 31.73 kg/m². BMI is above normal parameters. Recommendations include: educational material and exercise counseling.    Soumya was seen today for s/p l4-5 discectomy.    Diagnoses and all orders for this visit:    Lumbar disc herniation with radiculopathy  -     MRI Lumbar Spine With & Without Contrast; Future      Return in about 4 days (around 9/21/2020), or Telephone visit.

## 2020-09-21 ENCOUNTER — HOSPITAL ENCOUNTER (OUTPATIENT)
Dept: MRI IMAGING | Facility: HOSPITAL | Age: 50
Discharge: HOME | End: 2020-09-21
Admitting: PHYSICIAN ASSISTANT

## 2020-09-21 ENCOUNTER — OFFICE VISIT (OUTPATIENT)
Dept: NEUROSURGERY | Facility: CLINIC | Age: 50
End: 2020-09-21

## 2020-09-21 ENCOUNTER — TELEPHONE (OUTPATIENT)
Dept: NEUROSURGERY | Facility: CLINIC | Age: 50
End: 2020-09-21

## 2020-09-21 DIAGNOSIS — M51.16 LUMBAR DISC HERNIATION WITH RADICULOPATHY: ICD-10-CM

## 2020-09-21 DIAGNOSIS — M51.16 LUMBAR DISC HERNIATION WITH RADICULOPATHY: Primary | ICD-10-CM

## 2020-09-21 PROCEDURE — 72158 MRI LUMBAR SPINE W/O & W/DYE: CPT

## 2020-09-21 PROCEDURE — A9577 INJ MULTIHANCE: HCPCS | Performed by: PHYSICIAN ASSISTANT

## 2020-09-21 PROCEDURE — 99024 POSTOP FOLLOW-UP VISIT: CPT | Performed by: PHYSICIAN ASSISTANT

## 2020-09-21 PROCEDURE — 0 GADOBENATE DIMEGLUMINE 529 MG/ML SOLUTION: Performed by: PHYSICIAN ASSISTANT

## 2020-09-21 RX ORDER — HYDROCODONE BITARTRATE AND ACETAMINOPHEN 7.5; 325 MG/1; MG/1
1 TABLET ORAL EVERY 6 HOURS PRN
Qty: 40 TABLET | Refills: 0 | Status: SHIPPED | OUTPATIENT
Start: 2020-09-21 | End: 2022-10-04

## 2020-09-21 RX ADMIN — GADOBENATE DIMEGLUMINE 19 ML: 529 INJECTION, SOLUTION INTRAVENOUS at 19:45

## 2020-09-21 NOTE — PROGRESS NOTES
"You have chosen to receive care through a telephone visit. Do you consent to use a telephone visit for your medical care today? Yes    15 minutes    Patient: Soumya Howe  : 1970    Primary Care Provider: Gary Bowie MD      Chief Complaint: Ongoing sciatica with dorsiflexor weakness on left    History of Present Illness:       Patient very sweet 49-year-old female who works for the [employer].  She is a teacher.    She underwent an uncomplicated microdiscectomy on 2020 and had about 3 weeks of pain relief but she was doing some sorting on the floor and noticed resurgence of her low back and left leg pain.  Patient came in last week and was \"dragging her foot due to the dorsiflexor weakness and the pain.  Patient does have a subtle foot drop that is not present prior to the surgery.  This came on along with the pain.    Patient was trialed on some conservative medicines like Medrol Dosepak and this has not given her any relief.  Patient continues to have the foot weakness.    We will get an MRI of her in a semiurgent fashion.  Hopefully we will be able to take the pressure off the nerve for her once again.  This sounds like a recurrent disc herniation left side L4-5.  We will confirm this and likely do a reexploration of the nerve root.    Review of Systems   Constitutional: Negative for activity change, appetite change, chills, fatigue and fever.   HENT: Negative for congestion, dental problem, ear pain, hearing loss, sinus pressure and tinnitus.    Eyes: Negative for pain and redness.   Respiratory: Negative for apnea, cough, shortness of breath and wheezing.    Cardiovascular: Negative for chest pain, palpitations and leg swelling.   Gastrointestinal: Negative for abdominal distention, abdominal pain, blood in stool, constipation, diarrhea, nausea and vomiting.   Endocrine: Negative for cold intolerance, heat intolerance and polyuria.   Genitourinary: Negative for " enuresis, frequency and urgency.   Musculoskeletal: Positive for back pain ( Left leg sciatica).   Skin: Negative for color change and rash.   Neurological: Positive for weakness ( Left dorsiflexor). Negative for dizziness, tremors, seizures, syncope, speech difficulty, light-headedness, numbness and headaches.   Psychiatric/Behavioral: Negative for behavioral problems and confusion. The patient is not nervous/anxious.        Past Medical History:     Past Medical History:   Diagnosis Date   • Bipolar 1 disorder (CMS/HCC)    • Disease of thyroid gland     po meds daily    • GERD (gastroesophageal reflux disease)    • Migraine     last migraine 20       Family History:     Family History   Problem Relation Age of Onset   • Breast cancer Paternal Grandmother 60   • Ovarian cancer Neg Hx        Social History:    reports that she has never smoked. She has never used smokeless tobacco. She reports previous alcohol use. She reports that she does not use drugs.   SMOKING STATUS: Non-smoker    Surgical History:     Past Surgical History:   Procedure Laterality Date   •  SECTION      x2   • ENDOMETRIAL ABLATION     • LAPAROSCOPIC GASTRIC BANDING     • LUMBAR DISCECTOMY Left 2020    Procedure: LUMBAR MICRODISCECTOMY LEFT L4-5;  Surgeon: Mamadou Estrada MD;  [facility]: [facility];  Service: Neurosurgery;  Laterality: Left;   • WISDOM TOOTH EXTRACTION         Allergies:   Other, Neosporin [bacitracin-polymyxin b], and Poison ivy extract    Physical Exam:    Vital Signs:There were no vitals taken for this visit.   BMI: There is no height or weight on file to calculate BMI.     Exam limited secondary to telephone encounter    Medical Decision Making    Data Review:   No new films reviewed at this visit    Diagnosis:   Recurrent left L4-5 disc herniation    Treatment Options:   We are going to proceed with MRI because conservatism has failed.  Patient continues with foot weakness and if we can  decompress this nerve hopefully avoid any permanent damage.    Patient understands the typical relief with her recurrent disc is somewhat less effective than the initial procedure but hopefully will be able to avoid any recurrent discs after the second procedure.    Patient is understanding and wishes to proceed.    Patient's There is no height or weight on file to calculate BMI. BMI is above normal parameters. Recommendations include: educational material and exercise counseling.     Diagnosis Plan   1. Lumbar disc herniation with radiculopathy  HYDROcodone-acetaminophen (NORCO) 7.5-325 MG per tablet    MRI Lumbar Spine With & Without Contrast

## 2020-09-21 NOTE — TELEPHONE ENCOUNTER
Pt had visit with Frandy today VIA telephone visit. RX for Norco on Dr. Estrada's desk for sig.

## 2020-09-22 ENCOUNTER — PREP FOR SURGERY (OUTPATIENT)
Dept: OTHER | Facility: HOSPITAL | Age: 50
End: 2020-09-22

## 2020-09-22 ENCOUNTER — PRIOR AUTHORIZATION (OUTPATIENT)
Dept: NEUROSURGERY | Facility: CLINIC | Age: 50
End: 2020-09-22

## 2020-09-22 ENCOUNTER — APPOINTMENT (OUTPATIENT)
Dept: PREADMISSION TESTING | Facility: HOSPITAL | Age: 50
End: 2020-09-22

## 2020-09-22 ENCOUNTER — DOCUMENTATION (OUTPATIENT)
Dept: NEUROSURGERY | Facility: CLINIC | Age: 50
End: 2020-09-22

## 2020-09-22 ENCOUNTER — ANESTHESIA EVENT (OUTPATIENT)
Dept: PERIOP | Facility: HOSPITAL | Age: 50
End: 2020-09-22

## 2020-09-22 VITALS — WEIGHT: 203 LBS | HEIGHT: 67 IN | BODY MASS INDEX: 31.86 KG/M2

## 2020-09-22 DIAGNOSIS — M53.86 SCIATICA ASSOCIATED WITH DISORDER OF LUMBAR SPINE: ICD-10-CM

## 2020-09-22 DIAGNOSIS — M53.86 SCIATICA ASSOCIATED WITH DISORDER OF LUMBAR SPINE: Primary | ICD-10-CM

## 2020-09-22 PROBLEM — M51.26 RECURRENT HERNIATION OF LUMBAR DISC: Status: ACTIVE | Noted: 2020-09-22

## 2020-09-22 LAB
ANION GAP SERPL CALCULATED.3IONS-SCNC: 8 MMOL/L (ref 5–15)
BUN SERPL-MCNC: 10 MG/DL (ref 6–20)
BUN/CREAT SERPL: 13.7 (ref 7–25)
CALCIUM SPEC-SCNC: 9.6 MG/DL (ref 8.6–10.5)
CHLORIDE SERPL-SCNC: 100 MMOL/L (ref 98–107)
CO2 SERPL-SCNC: 29 MMOL/L (ref 22–29)
CREAT SERPL-MCNC: 0.73 MG/DL (ref 0.57–1)
DEPRECATED RDW RBC AUTO: 41 FL (ref 37–54)
ERYTHROCYTE [DISTWIDTH] IN BLOOD BY AUTOMATED COUNT: 11.9 % (ref 12.3–15.4)
GFR SERPL CREATININE-BSD FRML MDRD: 85 ML/MIN/1.73
GLUCOSE SERPL-MCNC: 76 MG/DL (ref 65–99)
HCT VFR BLD AUTO: 42.6 % (ref 34–46.6)
HGB BLD-MCNC: 13.6 G/DL (ref 12–15.9)
MCH RBC QN AUTO: 29.8 PG (ref 26.6–33)
MCHC RBC AUTO-ENTMCNC: 31.9 G/DL (ref 31.5–35.7)
MCV RBC AUTO: 93.4 FL (ref 79–97)
PLATELET # BLD AUTO: 356 10*3/MM3 (ref 140–450)
PMV BLD AUTO: 9.7 FL (ref 6–12)
POTASSIUM SERPL-SCNC: 4.3 MMOL/L (ref 3.5–5.2)
RBC # BLD AUTO: 4.56 10*6/MM3 (ref 3.77–5.28)
SARS-COV-2 RNA RESP QL NAA+PROBE: NOT DETECTED
SODIUM SERPL-SCNC: 137 MMOL/L (ref 136–145)
WBC # BLD AUTO: 7.86 10*3/MM3 (ref 3.4–10.8)

## 2020-09-22 PROCEDURE — 87635 SARS-COV-2 COVID-19 AMP PRB: CPT | Performed by: NEUROLOGICAL SURGERY

## 2020-09-22 PROCEDURE — 80048 BASIC METABOLIC PNL TOTAL CA: CPT | Performed by: NEUROLOGICAL SURGERY

## 2020-09-22 PROCEDURE — C9803 HOPD COVID-19 SPEC COLLECT: HCPCS

## 2020-09-22 PROCEDURE — 85027 COMPLETE CBC AUTOMATED: CPT | Performed by: NEUROLOGICAL SURGERY

## 2020-09-22 PROCEDURE — 36415 COLL VENOUS BLD VENIPUNCTURE: CPT

## 2020-09-22 RX ORDER — SODIUM CHLORIDE 0.9 % (FLUSH) 0.9 %
10 SYRINGE (ML) INJECTION AS NEEDED
Status: CANCELLED | OUTPATIENT
Start: 2020-09-22

## 2020-09-22 RX ORDER — IBUPROFEN 800 MG/1
800 TABLET ORAL ONCE
Status: CANCELLED | OUTPATIENT
Start: 2020-09-22 | End: 2020-09-22

## 2020-09-22 RX ORDER — CEFAZOLIN SODIUM 2 G/100ML
2 INJECTION, SOLUTION INTRAVENOUS ONCE
Status: CANCELLED | OUTPATIENT
Start: 2020-09-22 | End: 2020-09-22

## 2020-09-22 RX ORDER — GABAPENTIN 300 MG/1
300 CAPSULE ORAL 3 TIMES DAILY
Status: ON HOLD | COMMUNITY
Start: 2020-09-15 | End: 2020-09-23

## 2020-09-22 RX ORDER — ACETAMINOPHEN 325 MG/1
650 TABLET ORAL ONCE
Status: CANCELLED | OUTPATIENT
Start: 2020-09-22 | End: 2020-09-22

## 2020-09-22 RX ORDER — SODIUM CHLORIDE, SODIUM LACTATE, POTASSIUM CHLORIDE, CALCIUM CHLORIDE 600; 310; 30; 20 MG/100ML; MG/100ML; MG/100ML; MG/100ML
9 INJECTION, SOLUTION INTRAVENOUS CONTINUOUS
Status: CANCELLED | OUTPATIENT
Start: 2020-09-22

## 2020-09-22 RX ORDER — HYDROCODONE BITARTRATE AND ACETAMINOPHEN 7.5; 325 MG/1; MG/1
1 TABLET ORAL ONCE
Status: CANCELLED | OUTPATIENT
Start: 2020-09-22 | End: 2020-09-22

## 2020-09-22 RX ORDER — SODIUM CHLORIDE 0.9 % (FLUSH) 0.9 %
10 SYRINGE (ML) INJECTION EVERY 12 HOURS SCHEDULED
Status: CANCELLED | OUTPATIENT
Start: 2020-09-22

## 2020-09-22 RX ORDER — FAMOTIDINE 20 MG/1
20 TABLET, FILM COATED ORAL
Status: CANCELLED | OUTPATIENT
Start: 2020-09-22

## 2020-09-22 RX ORDER — FAMOTIDINE 10 MG/ML
20 INJECTION, SOLUTION INTRAVENOUS ONCE
Status: CANCELLED | OUTPATIENT
Start: 2020-09-22 | End: 2020-09-22

## 2020-09-22 RX ORDER — CHLORHEXIDINE GLUCONATE 4 G/100ML
SOLUTION TOPICAL
Qty: 120 ML | Refills: 0 | Status: ON HOLD | OUTPATIENT
Start: 2020-09-22 | End: 2020-09-23

## 2020-09-22 NOTE — PROGRESS NOTES
Discussed patient's MRIs results was read out as no evidence of significant herniation I feel that there is an inferiorly extruded disc is very consistent with her symptoms she has weakness of the foot will make arrangements for reexploration at the 4 5 level.  She did excellently for about 2 weeks we will try to get her out of her pain is been a pleasure to neurosurgical care

## 2020-09-22 NOTE — TELEPHONE ENCOUNTER
Called and discussed... MRI report says no recurrence, however, I want to look at it with gisselle for final clarification... as soon as I discuss with gisselle I will call her back

## 2020-09-22 NOTE — TELEPHONE ENCOUNTER
Called and advised patient of the PA approval. Patient verbalized understanding. Patient stated she had an MRI done yesterday, ordered by Frandy. Patient wanting to know the results of the MRI, and what [facility] plans are now.   Patient was advised Frandy was in surgery today, and would call her with results/update as soon as he could.

## 2020-09-22 NOTE — TELEPHONE ENCOUNTER
PATIENT CALLED REGARDING RX THAT WAS PICKED UP YESTERDAY. PATIENT ATTEMPTED TO TAKE RX TO PREFERRED PHARMACY TO FILL BUT [facility] IN [place] REFUSED TO FILL & ASK TO SPEAK TO PA DIRECTLY PRIOR TO PROCEEDING. PH: [phone]    PATIENT CAN BE CONTACTED [phone] WITH AN UPDATE

## 2020-09-22 NOTE — TELEPHONE ENCOUNTER
Prior Authorization was approved from 09/22/2020 through 09/22/2021. Patient  Notified and verbalized understanding.

## 2020-09-22 NOTE — TELEPHONE ENCOUNTER
I called and talked with [facility]. Pharmacy stated the patient actually picked this medication up this morning, and used a GoodRx coupon in it.   A prior Authorization was done this morning, and has since then been approved by the patients insurance. Patient

## 2020-09-23 ENCOUNTER — APPOINTMENT (OUTPATIENT)
Dept: GENERAL RADIOLOGY | Facility: HOSPITAL | Age: 50
End: 2020-09-23

## 2020-09-23 ENCOUNTER — HOSPITAL ENCOUNTER (OUTPATIENT)
Facility: HOSPITAL | Age: 50
Discharge: HOME | End: 2020-09-23
Attending: NEUROLOGICAL SURGERY | Admitting: NEUROLOGICAL SURGERY

## 2020-09-23 ENCOUNTER — ANESTHESIA (OUTPATIENT)
Dept: PERIOP | Facility: HOSPITAL | Age: 50
End: 2020-09-23

## 2020-09-23 VITALS
SYSTOLIC BLOOD PRESSURE: 123 MMHG | RESPIRATION RATE: 16 BRPM | HEART RATE: 67 BPM | TEMPERATURE: 97.6 F | OXYGEN SATURATION: 97 % | DIASTOLIC BLOOD PRESSURE: 81 MMHG

## 2020-09-23 DIAGNOSIS — M53.86 SCIATICA ASSOCIATED WITH DISORDER OF LUMBAR SPINE: ICD-10-CM

## 2020-09-23 DIAGNOSIS — M51.26 RECURRENT HERNIATION OF LUMBAR DISC: Primary | ICD-10-CM

## 2020-09-23 PROCEDURE — 25010000003 LIDOCAINE 1 % SOLUTION: Performed by: NURSE ANESTHETIST, CERTIFIED REGISTERED

## 2020-09-23 PROCEDURE — 25010000003 CEFAZOLIN IN DEXTROSE 2-4 GM/100ML-% SOLUTION: Performed by: NEUROLOGICAL SURGERY

## 2020-09-23 PROCEDURE — 76000 FLUOROSCOPY <1 HR PHYS/QHP: CPT

## 2020-09-23 PROCEDURE — 25010000002 FENTANYL CITRATE (PF) 100 MCG/2ML SOLUTION: Performed by: NURSE ANESTHETIST, CERTIFIED REGISTERED

## 2020-09-23 PROCEDURE — 25010000002 ONDANSETRON PER 1 MG: Performed by: NURSE ANESTHETIST, CERTIFIED REGISTERED

## 2020-09-23 PROCEDURE — 81025 URINE PREGNANCY TEST: CPT | Performed by: ANESTHESIOLOGY

## 2020-09-23 PROCEDURE — 63030 LAMOT DCMPRN NRV RT 1 LMBR: CPT | Performed by: PHYSICIAN ASSISTANT

## 2020-09-23 PROCEDURE — 25010000002 DEXAMETHASONE PER 1 MG: Performed by: NURSE ANESTHETIST, CERTIFIED REGISTERED

## 2020-09-23 PROCEDURE — 63030 LAMOT DCMPRN NRV RT 1 LMBR: CPT | Performed by: NEUROLOGICAL SURGERY

## 2020-09-23 PROCEDURE — 25010000003 CEFAZOLIN PER 500 MG: Performed by: NEUROLOGICAL SURGERY

## 2020-09-23 PROCEDURE — 25010000002 PROPOFOL 10 MG/ML EMULSION: Performed by: NURSE ANESTHETIST, CERTIFIED REGISTERED

## 2020-09-23 PROCEDURE — 25010000002 NEOSTIGMINE 10 MG/10ML SOLUTION: Performed by: NURSE ANESTHETIST, CERTIFIED REGISTERED

## 2020-09-23 DEVICE — FLOSEAL HEMOSTATIC MATRIX, 10ML
Type: IMPLANTABLE DEVICE | Status: FUNCTIONAL
Brand: FLOSEAL HEMOSTATIC MATRIX

## 2020-09-23 DEVICE — HEMOST ABS SURGIFOAM SZ100 8X12 10MM: Type: IMPLANTABLE DEVICE | Status: FUNCTIONAL

## 2020-09-23 RX ORDER — TRAMADOL HYDROCHLORIDE 50 MG/1
50 TABLET ORAL EVERY 6 HOURS PRN
Qty: 40 TABLET | Refills: 0 | Status: SHIPPED | OUTPATIENT
Start: 2020-09-23 | End: 2022-10-04

## 2020-09-23 RX ORDER — HYDROCODONE BITARTRATE AND ACETAMINOPHEN 5; 325 MG/1; MG/1
1 TABLET ORAL ONCE AS NEEDED
Status: CANCELLED | OUTPATIENT
Start: 2020-09-23 | End: 2020-10-03

## 2020-09-23 RX ORDER — MIDAZOLAM HYDROCHLORIDE 1 MG/ML
2 INJECTION INTRAMUSCULAR; INTRAVENOUS ONCE
Status: DISCONTINUED | OUTPATIENT
Start: 2020-09-23 | End: 2020-09-23 | Stop reason: HOSPADM

## 2020-09-23 RX ORDER — MAGNESIUM HYDROXIDE 1200 MG/15ML
LIQUID ORAL AS NEEDED
Status: DISCONTINUED | OUTPATIENT
Start: 2020-09-23 | End: 2020-09-23 | Stop reason: HOSPADM

## 2020-09-23 RX ORDER — DEXAMETHASONE SODIUM PHOSPHATE 4 MG/ML
INJECTION, SOLUTION INTRA-ARTICULAR; INTRALESIONAL; INTRAMUSCULAR; INTRAVENOUS; SOFT TISSUE AS NEEDED
Status: DISCONTINUED | OUTPATIENT
Start: 2020-09-23 | End: 2020-09-23 | Stop reason: SURG

## 2020-09-23 RX ORDER — BUPIVACAINE HYDROCHLORIDE 2.5 MG/ML
INJECTION, SOLUTION EPIDURAL; INFILTRATION; INTRACAUDAL AS NEEDED
Status: DISCONTINUED | OUTPATIENT
Start: 2020-09-23 | End: 2020-09-23 | Stop reason: HOSPADM

## 2020-09-23 RX ORDER — ACETAMINOPHEN 325 MG/1
650 TABLET ORAL ONCE
Status: COMPLETED | OUTPATIENT
Start: 2020-09-23 | End: 2020-09-23

## 2020-09-23 RX ORDER — NEOSTIGMINE METHYLSULFATE 1 MG/ML
INJECTION, SOLUTION INTRAVENOUS AS NEEDED
Status: DISCONTINUED | OUTPATIENT
Start: 2020-09-23 | End: 2020-09-23 | Stop reason: SURG

## 2020-09-23 RX ORDER — HYDROCODONE BITARTRATE AND ACETAMINOPHEN 7.5; 325 MG/1; MG/1
1 TABLET ORAL EVERY 6 HOURS PRN
Status: DISCONTINUED | OUTPATIENT
Start: 2020-09-23 | End: 2020-09-24 | Stop reason: HOSPADM

## 2020-09-23 RX ORDER — PROPOFOL 10 MG/ML
VIAL (ML) INTRAVENOUS AS NEEDED
Status: DISCONTINUED | OUTPATIENT
Start: 2020-09-23 | End: 2020-09-23 | Stop reason: SURG

## 2020-09-23 RX ORDER — LIDOCAINE HYDROCHLORIDE AND EPINEPHRINE 5; 5 MG/ML; UG/ML
INJECTION, SOLUTION INFILTRATION; PERINEURAL AS NEEDED
Status: DISCONTINUED | OUTPATIENT
Start: 2020-09-23 | End: 2020-09-23 | Stop reason: HOSPADM

## 2020-09-23 RX ORDER — ROCURONIUM BROMIDE 10 MG/ML
INJECTION, SOLUTION INTRAVENOUS AS NEEDED
Status: DISCONTINUED | OUTPATIENT
Start: 2020-09-23 | End: 2020-09-23 | Stop reason: SURG

## 2020-09-23 RX ORDER — SODIUM CHLORIDE, SODIUM LACTATE, POTASSIUM CHLORIDE, CALCIUM CHLORIDE 600; 310; 30; 20 MG/100ML; MG/100ML; MG/100ML; MG/100ML
INJECTION, SOLUTION INTRAVENOUS CONTINUOUS PRN
Status: DISCONTINUED | OUTPATIENT
Start: 2020-09-23 | End: 2020-09-23 | Stop reason: SURG

## 2020-09-23 RX ORDER — LIDOCAINE HYDROCHLORIDE 10 MG/ML
INJECTION, SOLUTION INFILTRATION; PERINEURAL AS NEEDED
Status: DISCONTINUED | OUTPATIENT
Start: 2020-09-23 | End: 2020-09-23 | Stop reason: SURG

## 2020-09-23 RX ORDER — PROPOFOL 10 MG/ML
VIAL (ML) INTRAVENOUS CONTINUOUS PRN
Status: DISCONTINUED | OUTPATIENT
Start: 2020-09-23 | End: 2020-09-23 | Stop reason: SURG

## 2020-09-23 RX ORDER — GLYCOPYRROLATE 0.2 MG/ML
INJECTION INTRAMUSCULAR; INTRAVENOUS AS NEEDED
Status: DISCONTINUED | OUTPATIENT
Start: 2020-09-23 | End: 2020-09-23 | Stop reason: SURG

## 2020-09-23 RX ORDER — HYDROCODONE BITARTRATE AND ACETAMINOPHEN 7.5; 325 MG/1; MG/1
1 TABLET ORAL ONCE
Status: COMPLETED | OUTPATIENT
Start: 2020-09-23 | End: 2020-09-23

## 2020-09-23 RX ORDER — ONDANSETRON 2 MG/ML
4 INJECTION INTRAMUSCULAR; INTRAVENOUS ONCE AS NEEDED
Status: DISCONTINUED | OUTPATIENT
Start: 2020-09-23 | End: 2020-09-24 | Stop reason: HOSPADM

## 2020-09-23 RX ORDER — HYDROCODONE BITARTRATE AND ACETAMINOPHEN 5; 325 MG/1; MG/1
1-2 TABLET ORAL EVERY 4 HOURS PRN
Qty: 30 TABLET | Refills: 0 | Status: SHIPPED | OUTPATIENT
Start: 2020-09-23 | End: 2022-10-04

## 2020-09-23 RX ORDER — LIDOCAINE HYDROCHLORIDE 10 MG/ML
0.5 INJECTION, SOLUTION EPIDURAL; INFILTRATION; INTRACAUDAL; PERINEURAL ONCE AS NEEDED
Status: COMPLETED | OUTPATIENT
Start: 2020-09-23 | End: 2020-09-23

## 2020-09-23 RX ORDER — IBUPROFEN 800 MG/1
800 TABLET ORAL ONCE
Status: COMPLETED | OUTPATIENT
Start: 2020-09-23 | End: 2020-09-23

## 2020-09-23 RX ORDER — FAMOTIDINE 20 MG/1
20 TABLET, FILM COATED ORAL ONCE
Status: COMPLETED | OUTPATIENT
Start: 2020-09-23 | End: 2020-09-23

## 2020-09-23 RX ORDER — CEFAZOLIN SODIUM 2 G/100ML
2 INJECTION, SOLUTION INTRAVENOUS ONCE
Status: COMPLETED | OUTPATIENT
Start: 2020-09-23 | End: 2020-09-23

## 2020-09-23 RX ORDER — SODIUM CHLORIDE, SODIUM LACTATE, POTASSIUM CHLORIDE, CALCIUM CHLORIDE 600; 310; 30; 20 MG/100ML; MG/100ML; MG/100ML; MG/100ML
9 INJECTION, SOLUTION INTRAVENOUS CONTINUOUS
Status: DISCONTINUED | OUTPATIENT
Start: 2020-09-23 | End: 2020-09-24 | Stop reason: HOSPADM

## 2020-09-23 RX ORDER — ONDANSETRON 2 MG/ML
INJECTION INTRAMUSCULAR; INTRAVENOUS AS NEEDED
Status: DISCONTINUED | OUTPATIENT
Start: 2020-09-23 | End: 2020-09-23 | Stop reason: SURG

## 2020-09-23 RX ORDER — FENTANYL CITRATE 50 UG/ML
50 INJECTION, SOLUTION INTRAMUSCULAR; INTRAVENOUS
Status: DISCONTINUED | OUTPATIENT
Start: 2020-09-23 | End: 2020-09-24 | Stop reason: HOSPADM

## 2020-09-23 RX ADMIN — SODIUM CHLORIDE, POTASSIUM CHLORIDE, SODIUM LACTATE AND CALCIUM CHLORIDE 9 ML/HR: 600; 310; 30; 20 INJECTION, SOLUTION INTRAVENOUS at 07:30

## 2020-09-23 RX ADMIN — ACETAMINOPHEN 650 MG: 325 TABLET ORAL at 07:50

## 2020-09-23 RX ADMIN — EPHEDRINE SULFATE 20 MG: 50 INJECTION INTRAMUSCULAR; INTRAVENOUS; SUBCUTANEOUS at 10:03

## 2020-09-23 RX ADMIN — PROPOFOL 150 MG: 10 INJECTION, EMULSION INTRAVENOUS at 09:31

## 2020-09-23 RX ADMIN — FENTANYL CITRATE 50 MCG: 0.05 INJECTION, SOLUTION INTRAMUSCULAR; INTRAVENOUS at 10:55

## 2020-09-23 RX ADMIN — FAMOTIDINE 20 MG: 20 TABLET, FILM COATED ORAL at 07:51

## 2020-09-23 RX ADMIN — FENTANYL CITRATE 50 MCG: 0.05 INJECTION, SOLUTION INTRAMUSCULAR; INTRAVENOUS at 11:10

## 2020-09-23 RX ADMIN — SODIUM CHLORIDE, POTASSIUM CHLORIDE, SODIUM LACTATE AND CALCIUM CHLORIDE: 600; 310; 30; 20 INJECTION, SOLUTION INTRAVENOUS at 09:27

## 2020-09-23 RX ADMIN — ONDANSETRON 4 MG: 2 INJECTION INTRAMUSCULAR; INTRAVENOUS at 10:19

## 2020-09-23 RX ADMIN — GLYCOPYRROLATE 0.4 MG: 0.2 INJECTION INTRAMUSCULAR; INTRAVENOUS at 10:19

## 2020-09-23 RX ADMIN — ROCURONIUM BROMIDE 50 MG: 10 INJECTION INTRAVENOUS at 09:31

## 2020-09-23 RX ADMIN — PROPOFOL 25 MCG/KG/MIN: 10 INJECTION, EMULSION INTRAVENOUS at 09:43

## 2020-09-23 RX ADMIN — HYDROCODONE BITARTRATE AND ACETAMINOPHEN 1 TABLET: 7.5; 325 TABLET ORAL at 07:51

## 2020-09-23 RX ADMIN — CEFAZOLIN SODIUM 2 G: 2 INJECTION, SOLUTION INTRAVENOUS at 09:30

## 2020-09-23 RX ADMIN — DEXAMETHASONE SODIUM PHOSPHATE 8 MG: 4 INJECTION, SOLUTION INTRAMUSCULAR; INTRAVENOUS at 09:43

## 2020-09-23 RX ADMIN — SODIUM CHLORIDE, POTASSIUM CHLORIDE, SODIUM LACTATE AND CALCIUM CHLORIDE: 600; 310; 30; 20 INJECTION, SOLUTION INTRAVENOUS at 10:18

## 2020-09-23 RX ADMIN — LIDOCAINE HYDROCHLORIDE 50 MG: 10 INJECTION, SOLUTION INFILTRATION; PERINEURAL at 09:31

## 2020-09-23 RX ADMIN — LIDOCAINE HYDROCHLORIDE 0.2 ML: 10 INJECTION, SOLUTION EPIDURAL; INFILTRATION; INTRACAUDAL; PERINEURAL at 07:30

## 2020-09-23 RX ADMIN — IBUPROFEN 800 MG: 800 TABLET, FILM COATED ORAL at 07:50

## 2020-09-23 RX ADMIN — NEOSTIGMINE 3 MG: 1 INJECTION INTRAVENOUS at 10:19

## 2020-09-23 RX ADMIN — HYDROCODONE BITARTRATE AND ACETAMINOPHEN 1 TABLET: 7.5; 325 TABLET ORAL at 11:42

## 2020-09-23 NOTE — ANESTHESIA PROCEDURE NOTES
Airway  Urgency: elective    Date/Time: 9/23/2020 9:32 AM  Airway not difficult    General Information and Staff    Patient location during procedure: OR    Indications and Patient Condition  Indications for airway management: airway protection    Preoxygenated: yes  MILS not maintained throughout  Mask difficulty assessment: 1 - vent by mask    Final Airway Details  Final airway type: endotracheal airway      Successful airway: ETT  Cuffed: yes   Successful intubation technique: direct laryngoscopy  Facilitating devices/methods: intubating stylet  Endotracheal tube insertion site: oral  Blade: [place]  Blade size: 2  ETT size (mm): 7.0  Cormack-Lehane Classification: grade I - full view of glottis  Placement verified by: chest auscultation and capnometry   Measured from: lips  ETT/EBT  to lips (cm): 20  Number of attempts at approach: 1  Assessment: lips, teeth, and gum same as pre-op and atraumatic intubation    Additional Comments  Negative epigastric sounds, Breath sound equal bilaterally with symmetric chest rise and fall.  Teeth intact, atraumatic

## 2020-09-23 NOTE — ANESTHESIA PREPROCEDURE EVALUATION
Anesthesia Evaluation                  Airway   Mallampati: I  TM distance: >3 FB  Neck ROM: full  No difficulty expected  Dental      Pulmonary    Cardiovascular         Neuro/Psych  (+) headaches, numbness, psychiatric history Bipolar,     GI/Hepatic/Renal/Endo    (+)  GERD,      Musculoskeletal     Abdominal    Substance History      OB/GYN          Other                        Anesthesia Plan    ASA 2     general     intravenous induction     Anesthetic plan, all risks, benefits, and alternatives have been provided, discussed and informed consent has been obtained with: patient.    Plan discussed with CRNA.

## 2020-09-23 NOTE — ANESTHESIA POSTPROCEDURE EVALUATION
Patient: Soumya Howe    Procedure Summary     Date: 09/23/20 [facility] / [facility]:  [facility]    Anesthesia Start: 0927 Anesthesia Stop: 1031    Procedure: LUMBAR MICRODISCECTOMY REDO L4-5 (Left Spine Lumbar) Diagnosis:       Sciatica associated with disorder of lumbar spine      (Sciatica associated with disorder of lumbar spine [M53.86])    Surgeon: Mamadou Estrada MD Provider: Vivek Castillo MD    Anesthesia Type: general ASA Status: 2          Anesthesia Type: general    Vitals  Vitals Value Taken Time   /76 09/23/20 1028   Temp 97.6 °F (36.4 °C) 09/23/20 1028   Pulse 60 09/23/20 1028   Resp 16 09/23/20 1028   SpO2 96 % 09/23/20 1028           Post Anesthesia Care and Evaluation    Patient location during evaluation: PACU  Patient participation: complete - patient participated  Level of consciousness: sleepy but conscious  Pain score: 0  Pain management: adequate  Airway patency: patent  Anesthetic complications: No anesthetic complications  PONV Status: none  Cardiovascular status: hemodynamically stable and acceptable  Respiratory status: nonlabored ventilation, acceptable, nasal cannula and oral airway  Hydration status: acceptable

## 2020-09-24 ENCOUNTER — APPOINTMENT (OUTPATIENT)
Dept: MRI IMAGING | Facility: HOSPITAL | Age: 50
End: 2020-09-24

## 2020-10-19 ENCOUNTER — OFFICE VISIT (OUTPATIENT)
Dept: NEUROSURGERY | Facility: CLINIC | Age: 50
End: 2020-10-19

## 2020-10-19 VITALS
TEMPERATURE: 98 F | BODY MASS INDEX: 32.02 KG/M2 | SYSTOLIC BLOOD PRESSURE: 120 MMHG | HEIGHT: 67 IN | DIASTOLIC BLOOD PRESSURE: 84 MMHG | WEIGHT: 204 LBS

## 2020-10-19 DIAGNOSIS — M51.26 RECURRENT HERNIATION OF LUMBAR DISC: ICD-10-CM

## 2020-10-19 DIAGNOSIS — M53.86 SCIATICA ASSOCIATED WITH DISORDER OF LUMBAR SPINE: ICD-10-CM

## 2020-10-19 DIAGNOSIS — M51.36 DDD (DEGENERATIVE DISC DISEASE), LUMBAR: Primary | ICD-10-CM

## 2020-10-19 PROCEDURE — 99024 POSTOP FOLLOW-UP VISIT: CPT | Performed by: PHYSICIAN ASSISTANT

## 2020-10-19 NOTE — PATIENT INSTRUCTIONS
Obesity, Adult  Obesity is the condition of having too much total body fat. Being overweight or obese means that your weight is greater than what is considered healthy for your body size. Obesity is determined by a measurement called BMI. BMI is an estimate of body fat and is calculated from height and weight. For adults, a BMI of 30 or higher is considered obese.  Obesity can lead to other health concerns and major illnesses, including:  · Stroke.  · Coronary artery disease (CAD).  · Type 2 diabetes.  · Some types of cancer, including cancers of the colon, breast, uterus, and gallbladder.  · Osteoarthritis.  · High blood pressure (hypertension).  · High cholesterol.  · Sleep apnea.  · Gallbladder stones.  · Infertility problems.  What are the causes?  Common causes of this condition include:  · Eating daily meals that are high in calories, sugar, and fat.  · Being born with genes that may make you more likely to become obese.  · Having a medical condition that causes obesity, including:  ? Hypothyroidism.  ? Polycystic ovarian syndrome (PCOS).  ? Binge-eating disorder.  ? Cushing syndrome.  · Taking certain medicines, such as steroids, antidepressants, and seizure medicines.  · Not being physically active (sedentary lifestyle).  · Not getting enough sleep.  · Drinking high amounts of sugar-sweetened beverages, such as soft drinks.  What increases the risk?  The following factors may make you more likely to develop this condition:  · Having a family history of obesity.  · Being a woman of  descent.  · Being a man of  descent.  · Living in an area with limited access to:  ? [place], recreation centers, or sidewalks.  ? Healthy food choices, such as grocery stores and farmers' markets.  What are the signs or symptoms?  The main sign of this condition is having too much body fat.  How is this diagnosed?  This condition is diagnosed based on:  · Your BMI. If you are an adult with a BMI of 30 or  higher, you are considered obese.  · Your waist circumference. This measures the distance around your waistline.  · Your skinfold thickness. Your health care provider may gently pinch a fold of your skin and measure it.  You may have other tests to check for underlying conditions.  How is this treated?  Treatment for this condition often includes changing your lifestyle. Treatment may include some or all of the following:  · Dietary changes. This may include developing a healthy meal plan.  · Regular physical activity. This may include activity that causes your heart to beat faster (aerobic exercise) and strength training. Work with your health care provider to design an exercise program that works for you.  · Medicine to help you lose weight if you are unable to lose 1 pound a week after 6 weeks of healthy eating and more physical activity.  · Treating conditions that cause the obesity (underlying conditions).  · Surgery. Surgical options may include gastric banding and gastric bypass. Surgery may be done if:  ? Other treatments have not helped to improve your condition.  ? You have a BMI of 40 or higher.  ? You have life-threatening health problems related to obesity.  Follow these instructions at home:  Eating and drinking    · Follow recommendations from your health care provider about what you eat and drink. Your health care provider may advise you to:  ? Limit fast food, sweets, and processed snack foods.  ? Choose low-fat options, such as low-fat milk instead of whole milk.  ? Eat 5 or more servings of fruits or vegetables every day.  ? Eat at home more often. This gives you more control over what you eat.  ? Choose healthy foods when you eat out.  ? Learn to read food labels. This will help you understand how much food is considered 1 serving.  ? Learn what a healthy serving size is.  ? Keep low-fat snacks available.  ? Limit sugary drinks, such as soda, fruit juice, sweetened iced tea, and flavored  milk.  · Drink enough water to keep your urine pale yellow.  · Do not follow a fad diet. Fad diets can be unhealthy and even dangerous.  Physical activity  · Exercise regularly, as told by your health care provider.  ? Most adults should get up to 150 minutes of moderate-intensity exercise every week.  ? Ask your health care provider what types of exercise are safe for you and how often you should exercise.  · Warm up and stretch before being active.  · Cool down and stretch after being active.  · Rest between periods of activity.  Lifestyle  · Work with your health care provider and a dietitian to set a weight-loss goal that is healthy and reasonable for you.  · Limit your screen time.  · Find ways to reward yourself that do not involve food.  · Do not drink alcohol if:  ? Your health care provider tells you not to drink.  ? You are pregnant, may be pregnant, or are planning to become pregnant.  · If you drink alcohol:  ? Limit how much you use to:  § 0-1 drink a day for women.  § 0-2 drinks a day for men.  ? Be aware of how much alcohol is in your drink. In the [place] one drink equals one 12 oz bottle of beer (355 mL), one 5 oz glass of wine (148 mL), or one 1½ oz glass of hard liquor (44 mL).  General instructions  · Keep a weight-loss journal to keep track of the food you eat and how much exercise you get.  · Take over-the-counter and prescription medicines only as told by your health care provider.  · Take vitamins and supplements only as told by your health care provider.  · Consider joining a support group. Your health care provider may be able to recommend a support group.  · Keep all follow-up visits as told by your health care provider. This is important.  Contact a health care provider if:  · You are unable to meet your weight loss goal after 6 weeks of dietary and lifestyle changes.  Get help right away if you are having:  · Trouble breathing.  · Suicidal thoughts or behaviors.  Summary  · Obesity is the  condition of having too much total body fat.  · Being overweight or obese means that your weight is greater than what is considered healthy for your body size.  · Work with your health care provider and a dietitian to set a weight-loss goal that is healthy and reasonable for you.  · Exercise regularly, as told by your health care provider. Ask your health care provider what types of exercise are safe for you and how often you should exercise.  This information is not intended to replace advice given to you by your health care provider. Make sure you discuss any questions you have with your health care provider.  Document Released: 01/25/2006 Document Revised: 08/22/2019 Document Reviewed: 08/22/2019  Elsevier Patient Education © 2020 [employer]

## 2020-10-19 NOTE — PROGRESS NOTES
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    Patient is well-known to the neurosurgical practice for undergoing a left-sided L4-5 microdiscectomy on 2020.  Patient had a acute and early onset of new symptoms patient did not respond to conservative management and required a second reexploration.    On 2020 patient had a revision discectomy and has been doing well since.    Patient states she only has intermittent pain in the left leg but is much better.  Patient has been very cautious about activities that she is doing at home and has not bending lifting or twisting.    Review of Systems   Constitutional: Negative for activity change, appetite change, chills, diaphoresis, fatigue, fever and unexpected weight change.   HENT: Negative for congestion, dental problem, drooling, ear discharge, ear pain, facial swelling, hearing loss, mouth sores, nosebleeds, postnasal drip, rhinorrhea, sinus pressure, sneezing, sore throat, tinnitus, trouble swallowing and voice change.    Eyes: Negative for photophobia, pain, discharge, redness, itching and visual disturbance.   Respiratory: Negative for apnea, cough, choking, chest tightness, shortness of breath, wheezing and stridor.    Cardiovascular: Negative for chest pain, palpitations and leg swelling.   Gastrointestinal: Negative for abdominal distention, abdominal pain, anal bleeding, blood in stool, constipation, diarrhea, nausea, rectal pain and vomiting.   Musculoskeletal: Negative for arthralgias, back pain, gait problem, joint swelling, myalgias, neck pain and neck stiffness.   Skin: Negative for color change, pallor, rash and wound.   Allergic/Immunologic: Negative for environmental allergies, food allergies and immunocompromised  "state.   Neurological: Negative for dizziness, tremors, seizures, syncope, facial asymmetry, speech difficulty, weakness, light-headedness, numbness and headaches.   Hematological: Negative for adenopathy. Does not bruise/bleed easily.   Psychiatric/Behavioral: Negative for agitation, behavioral problems, confusion, decreased concentration, dysphoric mood, self-injury, sleep disturbance and suicidal ideas. The patient is not nervous/anxious and is not hyperactive.        Past Medical History:     Past Medical History:   Diagnosis Date   • Bipolar 1 disorder (CMS/HCC)    • Disease of thyroid gland     po meds daily    • GERD (gastroesophageal reflux disease)    • Migraine     last migraine 20       Family History:     Family History   Problem Relation Age of Onset   • Breast cancer Paternal Grandmother 60   • Ovarian cancer Neg Hx        Social History:    reports that she has never smoked. She has never used smokeless tobacco. She reports previous alcohol use. She reports that she does not use drugs.   SMOKING STATUS: Non-smoker    Surgical History:     Past Surgical History:   Procedure Laterality Date   •  SECTION      x2   • ENDOMETRIAL ABLATION     • LAPAROSCOPIC GASTRIC BANDING     • LUMBAR DISCECTOMY Left 2020    Procedure: LUMBAR MICRODISCECTOMY LEFT L4-5;  Surgeon: Mamadou Estrada MD;  Location: [facility];  Service: Neurosurgery;  Laterality: Left;   • LUMBAR DISCECTOMY Left 2020    Procedure: LUMBAR MICRODISCECTOMY REDO L4-5 LEFT;  Surgeon: Mamadou Estrada MD;  Location: [facility] OR;  Service: Neurosurgery;  Laterality: Left;   • WISDOM TOOTH EXTRACTION         Allergies:   Other, Neosporin [bacitracin-polymyxin b], and Poison ivy extract    Physical Exam:    Vital Signs:/84   Temp 98 °F (36.7 °C)   Ht 170.2 cm (67\")   Wt 92.5 kg (204 lb)   BMI 31.95 kg/m²    BMI: Body mass index is 31.95 kg/m².       Incision:   The incision is well-healed and well " approximated.  No signs of infection, bleeding, or erythema.    Musculoskeletal:                                            Dorsiflexion is 5/5 Bilaterally                    Plantarflexion is 5/5 bilaterally                    Hip Flexion 5/5 bilaterally.                        Neurologic:                                         The patient is alert and oriented by 3.                       Sensation is equal bilaterally with no deficit.                        Reflexes:  2+ throughout       Medical Decision Making    Data Review:   No new films reviewed at this visit    Diagnosis:   Recurrent left L4-5 disc herniation    Treatment Options:   We are going to continue with physical therapy as well as core strengthening.  I have given her fairly strict instructions about lifting bending and twisting.  Patient should refrain from anything that would strain her back for another 60 days or so.    Patient will see us back in about 6 weeks to ensure she is making continued progress.  Patient is very pleased with surgical outcome.    Patient's Body mass index is 31.95 kg/m². BMI is above normal parameters. Recommendations include: educational material.    No diagnosis found.

## 2020-11-30 ENCOUNTER — OFFICE VISIT (OUTPATIENT)
Dept: NEUROSURGERY | Facility: CLINIC | Age: 50
End: 2020-11-30

## 2020-11-30 VITALS — HEIGHT: 67 IN | RESPIRATION RATE: 15 BRPM | TEMPERATURE: 97 F | WEIGHT: 204.6 LBS | BODY MASS INDEX: 32.11 KG/M2

## 2020-11-30 DIAGNOSIS — M54.32 SCIATICA OF LEFT SIDE: Primary | ICD-10-CM

## 2020-11-30 PROCEDURE — 99024 POSTOP FOLLOW-UP VISIT: CPT | Performed by: PHYSICIAN ASSISTANT

## 2020-11-30 RX ORDER — TOPIRAMATE 50 MG/1
TABLET, FILM COATED ORAL
COMMUNITY
Start: 2020-11-09 | End: 2022-10-04

## 2020-11-30 NOTE — PROGRESS NOTES
Patient: Soumya Howe  : 1970    Primary Care Provider: Gary Bowie MD      Chief Complaint: 6-week follow-up    History of Present Illness:       Patient is a very nice 50-year-old female who works for [employer] as a special .  Patient is well-known to the neurosurgical practice for undergoing a left-sided L4-5 microdiscectomy on 2020.  Patient represented with acute and early onset of new symptoms that did not respond to conservative management and she required a second exploration.  On 2020 patient had a revision discectomy at L4-5    Patient is been doing very well since and having intermittent bouts of pain whenever she is a little overactive.  Patient is very cautious about what she is lifting and doing as far as activities go.  I have suggested to her she maintain a fairly strict no lifting bending twisting until 90 days postop and then she will be about as good as we can get her.      Review of Systems   Constitutional: Negative for activity change, appetite change, chills, diaphoresis, fatigue, fever and unexpected weight change.   HENT: Negative for congestion, dental problem, drooling, ear discharge, ear pain, facial swelling, hearing loss, mouth sores, nosebleeds, postnasal drip, rhinorrhea, sinus pressure, sneezing, sore throat, tinnitus, trouble swallowing and voice change.    Eyes: Negative for photophobia, pain, discharge, redness, itching and visual disturbance.   Respiratory: Negative for apnea, cough, choking, chest tightness, shortness of breath, wheezing and stridor.    Cardiovascular: Negative for chest pain, palpitations and leg swelling.   Gastrointestinal: Negative for abdominal distention, abdominal pain, anal bleeding, blood in stool, constipation, diarrhea, nausea, rectal pain and vomiting.   Endocrine: Negative for cold intolerance, heat intolerance, polydipsia, polyphagia and polyuria.   Genitourinary: Negative for decreased  urine volume, difficulty urinating, dysuria, enuresis, flank pain, frequency, genital sores, hematuria and urgency.   Musculoskeletal: Negative for arthralgias, back pain, gait problem, joint swelling, myalgias, neck pain and neck stiffness.   Skin: Negative for color change, pallor, rash and wound.   Allergic/Immunologic: Negative for environmental allergies, food allergies and immunocompromised state.   Neurological: Negative for dizziness, tremors, seizures, syncope, facial asymmetry, speech difficulty, weakness, light-headedness, numbness and headaches.   Hematological: Negative for adenopathy. Does not bruise/bleed easily.   Psychiatric/Behavioral: Negative for agitation, behavioral problems, confusion, decreased concentration, dysphoric mood, hallucinations, self-injury, sleep disturbance and suicidal ideas. The patient is not nervous/anxious and is not hyperactive.    All other systems reviewed and are negative.      Past Medical History:     Past Medical History:   Diagnosis Date   • Bipolar 1 disorder (CMS/[facility])    • Disease of thyroid gland     po meds daily    • GERD (gastroesophageal reflux disease)    • Migraine     last migraine 21       Family History:     Family History   Problem Relation Age of Onset   • Breast cancer Paternal Grandmother 60   • Ovarian cancer Neg Hx        Social History:    reports that she has never smoked. She has never used smokeless tobacco. She reports previous alcohol use. She reports that she does not use drugs.   SMOKING STATUS: Non-smoker    Surgical History:     Past Surgical History:   Procedure Laterality Date   •  SECTION      x2   • ENDOMETRIAL ABLATION     • LAPAROSCOPIC GASTRIC BANDING     • LUMBAR DISCECTOMY Left 2020    Procedure: LUMBAR MICRODISCECTOMY LEFT L4-5;  Surgeon: Mamadou Estrada MD;  Location: [facility];  Service: Neurosurgery;  Laterality: Left;   • LUMBAR DISCECTOMY Left 2020    Procedure: LUMBAR MICRODISCECTOMY  "REDO L4-5 LEFT;  Surgeon: Mamadou Estrada MD;  Location: [facility];  Service: Neurosurgery;  Laterality: Left;   • WISDOM TOOTH EXTRACTION         Allergies:   Other, Neosporin [bacitracin-polymyxin b], and Poison ivy extract    Physical Exam:    Vital Signs:Temp 97 °F (36.1 °C)   Resp 15   Ht 170.2 cm (67\")   Wt 92.8 kg (204 lb 9.6 oz)   BMI 32.04 kg/m²    BMI: Body mass index is 32.04 kg/m².       Incision:   The incision is well-healed and well approximated.  No signs of infection, bleeding, or erythema.    Musculoskeletal:                                            Dorsiflexion is 5/5 Bilaterally                    Plantarflexion is 5/5 bilaterally                    Hip Flexion 5/5 bilaterally.                        Neurologic:                                         The patient is alert and oriented by 3.                       Sensation is equal bilaterally with no deficit.                        Reflexes:  2+ throughout       Medical Decision Making    Data Review:   No new films reviewed at this visit    Diagnosis:   L4-5 disc herniation   Left L4-5 microdiscectomy x2    Treatment Options:   At this point the patient is doing very well.  The patient is pleased with postsurgical outcome.  With the resolution of pain, I believe that it is adequate to see the patient back on an as-needed basis.  The patient has been educated on reasons that would necessitate a referral back to us in a more urgent manner.  The patient understands of his instructions and limitations for the best post-operative success.    It has been a pleasure providing neurosurgical care.    Frandy Umana PA-C    Patient's Body mass index is 32.04 kg/m². BMI is above normal parameters. Recommendations include: educational material.     Diagnosis Plan   1. Sciatica of left side       "

## 2020-11-30 NOTE — PATIENT INSTRUCTIONS
Obesity, Adult  Obesity is the condition of having too much total body fat. Being overweight or obese means that your weight is greater than what is considered healthy for your body size. Obesity is determined by a measurement called BMI. BMI is an estimate of body fat and is calculated from height and weight. For adults, a BMI of 30 or higher is considered obese.  Obesity can lead to other health concerns and major illnesses, including:  · Stroke.  · Coronary artery disease (CAD).  · Type 2 diabetes.  · Some types of cancer, including cancers of the colon, breast, uterus, and gallbladder.  · Osteoarthritis.  · High blood pressure (hypertension).  · High cholesterol.  · Sleep apnea.  · Gallbladder stones.  · Infertility problems.  What are the causes?  Common causes of this condition include:  · Eating daily meals that are high in calories, sugar, and fat.  · Being born with genes that may make you more likely to become obese.  · Having a medical condition that causes obesity, including:  ? Hypothyroidism.  ? Polycystic ovarian syndrome (PCOS).  ? Binge-eating disorder.  ? Cushing syndrome.  · Taking certain medicines, such as steroids, antidepressants, and seizure medicines.  · Not being physically active (sedentary lifestyle).  · Not getting enough sleep.  · Drinking high amounts of sugar-sweetened beverages, such as soft drinks.  What increases the risk?  The following factors may make you more likely to develop this condition:  · Having a family history of obesity.  · Being a woman of  descent.  · Being a man of  descent.  · Living in an area with limited access to:  ? [place], recreation centers, or sidewalks.  ? Healthy food choices, such as grocery stores and farmers' markets.  What are the signs or symptoms?  The main sign of this condition is having too much body fat.  How is this diagnosed?  This condition is diagnosed based on:  · Your BMI. If you are an adult with a BMI of 30 or  higher, you are considered obese.  · Your waist circumference. This measures the distance around your waistline.  · Your skinfold thickness. Your health care provider may gently pinch a fold of your skin and measure it.  You may have other tests to check for underlying conditions.  How is this treated?  Treatment for this condition often includes changing your lifestyle. Treatment may include some or all of the following:  · Dietary changes. This may include developing a healthy meal plan.  · Regular physical activity. This may include activity that causes your heart to beat faster (aerobic exercise) and strength training. Work with your health care provider to design an exercise program that works for you.  · Medicine to help you lose weight if you are unable to lose 1 pound a week after 6 weeks of healthy eating and more physical activity.  · Treating conditions that cause the obesity (underlying conditions).  · Surgery. Surgical options may include gastric banding and gastric bypass. Surgery may be done if:  ? Other treatments have not helped to improve your condition.  ? You have a BMI of 40 or higher.  ? You have life-threatening health problems related to obesity.  Follow these instructions at home:  Eating and drinking    · Follow recommendations from your health care provider about what you eat and drink. Your health care provider may advise you to:  ? Limit fast food, sweets, and processed snack foods.  ? Choose low-fat options, such as low-fat milk instead of whole milk.  ? Eat 5 or more servings of fruits or vegetables every day.  ? Eat at home more often. This gives you more control over what you eat.  ? Choose healthy foods when you eat out.  ? Learn to read food labels. This will help you understand how much food is considered 1 serving.  ? Learn what a healthy serving size is.  ? Keep low-fat snacks available.  ? Limit sugary drinks, such as soda, fruit juice, sweetened iced tea, and flavored  milk.  · Drink enough water to keep your urine pale yellow.  · Do not follow a fad diet. Fad diets can be unhealthy and even dangerous.  Physical activity  · Exercise regularly, as told by your health care provider.  ? Most adults should get up to 150 minutes of moderate-intensity exercise every week.  ? Ask your health care provider what types of exercise are safe for you and how often you should exercise.  · Warm up and stretch before being active.  · Cool down and stretch after being active.  · Rest between periods of activity.  Lifestyle  · Work with your health care provider and a dietitian to set a weight-loss goal that is healthy and reasonable for you.  · Limit your screen time.  · Find ways to reward yourself that do not involve food.  · Do not drink alcohol if:  ? Your health care provider tells you not to drink.  ? You are pregnant, may be pregnant, or are planning to become pregnant.  · If you drink alcohol:  ? Limit how much you use to:  § 0-1 drink a day for women.  § 0-2 drinks a day for men.  ? Be aware of how much alcohol is in your drink. In the [place] one drink equals one 12 oz bottle of beer (355 mL), one 5 oz glass of wine (148 mL), or one 1½ oz glass of hard liquor (44 mL).  General instructions  · Keep a weight-loss journal to keep track of the food you eat and how much exercise you get.  · Take over-the-counter and prescription medicines only as told by your health care provider.  · Take vitamins and supplements only as told by your health care provider.  · Consider joining a support group. Your health care provider may be able to recommend a support group.  · Keep all follow-up visits as told by your health care provider. This is important.  Contact a health care provider if:  · You are unable to meet your weight loss goal after 6 weeks of dietary and lifestyle changes.  Get help right away if you are having:  · Trouble breathing.  · Suicidal thoughts or behaviors.  Summary  · Obesity is the  condition of having too much total body fat.  · Being overweight or obese means that your weight is greater than what is considered healthy for your body size.  · Work with your health care provider and a dietitian to set a weight-loss goal that is healthy and reasonable for you.  · Exercise regularly, as told by your health care provider. Ask your health care provider what types of exercise are safe for you and how often you should exercise.  This information is not intended to replace advice given to you by your health care provider. Make sure you discuss any questions you have with your health care provider.  Document Revised: 08/22/2019 Document Reviewed: 08/22/2019  Elsevier Patient Education © 2020 [employer]

## 2020-12-29 ENCOUNTER — TRANSCRIBE ORDERS (OUTPATIENT)
Dept: ADMINISTRATIVE | Facility: HOSPITAL | Age: 50
End: 2020-12-29

## 2020-12-29 DIAGNOSIS — Z12.31 VISIT FOR SCREENING MAMMOGRAM: Primary | ICD-10-CM

## 2021-03-24 ENCOUNTER — APPOINTMENT (OUTPATIENT)
Dept: MAMMOGRAPHY | Facility: HOSPITAL | Age: 51
End: 2021-03-24

## 2021-03-29 RX ORDER — MEDROXYPROGESTERONE ACETATE 10 MG/1
10 TABLET ORAL DAILY
Qty: 90 TABLET | Refills: 1 | Status: SHIPPED | OUTPATIENT
Start: 2021-03-29 | End: 2021-08-20 | Stop reason: SDUPTHER

## 2021-04-01 ENCOUNTER — HOSPITAL ENCOUNTER (OUTPATIENT)
Dept: MAMMOGRAPHY | Facility: HOSPITAL | Age: 51
Discharge: HOME | End: 2021-04-01
Admitting: INTERNAL MEDICINE

## 2021-04-01 DIAGNOSIS — Z12.31 VISIT FOR SCREENING MAMMOGRAM: ICD-10-CM

## 2021-04-01 PROCEDURE — 77063 BREAST TOMOSYNTHESIS BI: CPT

## 2021-04-01 PROCEDURE — 77067 SCR MAMMO BI INCL CAD: CPT | Performed by: RADIOLOGY

## 2021-04-01 PROCEDURE — 77067 SCR MAMMO BI INCL CAD: CPT

## 2021-04-01 PROCEDURE — 77063 BREAST TOMOSYNTHESIS BI: CPT | Performed by: RADIOLOGY

## 2021-04-12 ENCOUNTER — TELEPHONE (OUTPATIENT)
Dept: NEUROSURGERY | Facility: CLINIC | Age: 51
End: 2021-04-12

## 2021-04-12 DIAGNOSIS — M53.86 SCIATICA ASSOCIATED WITH DISORDER OF LUMBAR SPINE: Primary | ICD-10-CM

## 2021-04-12 RX ORDER — METHYLPREDNISOLONE 4 MG/1
TABLET ORAL
Qty: 1 EACH | Refills: 0 | Status: SHIPPED | OUTPATIENT
Start: 2021-04-12 | End: 2022-10-04

## 2021-04-12 NOTE — TELEPHONE ENCOUNTER
Call patient she is having similar pain as before but less severe.  Patient was attacked by dog and had to wrestle her dog out from underneath the boxer and the boxer was jumping on her.  A lot happen and she is really unsure when she started having the pain but woke up the next morning with recurrent pain.  It is less severe than previously but she wanted to call to check on this.    Put on steroid pack and she will call on Thursday to let us know how she is doing.

## 2021-04-12 NOTE — TELEPHONE ENCOUNTER
"    Caller: DALJIT ROMERO    Relationship to patient: PT/SELF    Best call back number: [phone]    Chief complaint: PT WAS \"ATTACKED\" BY A BOXER THIS WEEKEND AND IS HAVING SEVERE LOW BACK PAIN WHERE SHE HAD HER PREVIOUS SURGERY.  PT WAS NOT SEEN IN ER BUT HAS TO WATCH THE WAY SHE MOVES.    CURRENT PAIN LEVEL: 7/10    PT HAD PAIN MEDICATION LEFT OVER FROM PREVIOUS SURGERY BUT PT IS UNSURE OF THE MEDICATION. STATES SHE WILL BE OUT THIS EVENING OR TOMORROW MORNING.        Type of visit: FOLLOW UP    Requested date: ASAP    If rescheduling, when is the original appointment: PT WAS LAST SEEN 11/30/2020.    Additional notes: PLEASE CONTACT PT REGARDING THIS MATTER, QUESTIONS OR CONCERNS.    THANK YOU!          "

## 2021-04-19 RX ORDER — METHOCARBAMOL 750 MG/1
750 TABLET, FILM COATED ORAL 3 TIMES DAILY
Qty: 60 TABLET | Refills: 0 | Status: SHIPPED | OUTPATIENT
Start: 2021-04-19 | End: 2022-10-04

## 2021-04-19 NOTE — TELEPHONE ENCOUNTER
Spoke with patient. She states that her pain somewhat improved with the steroids, however still worsens with a lot of twisting movement.  I have called a muscle relaxer in for her.  I advised her to begin taking Tylenol and ibuprofen together when she is finished with the steroid pack.  She will call us in a couple of days if symptoms persist.  At that time we can get her in for an office visit if needed.

## 2021-04-19 NOTE — TELEPHONE ENCOUNTER
Caller: Soumya Howe    Relationship to patient: Self    Best call back number: [phone]    Patient is needing: PT CALLED AND STATED SHE RECEIVED HER MEDICATION ON Thursday. PT WOULD LIKE CARRIE SARKRA TO CALL HER BACK IN REGARDS TO THE LAST MESSAGE TO FOLLOW UP PLEASE ADVISE THANK YOU!

## 2021-08-20 ENCOUNTER — OFFICE VISIT (OUTPATIENT)
Dept: OBSTETRICS AND GYNECOLOGY | Facility: CLINIC | Age: 51
End: 2021-08-20

## 2021-08-20 VITALS
BODY MASS INDEX: 31.74 KG/M2 | DIASTOLIC BLOOD PRESSURE: 80 MMHG | WEIGHT: 202.2 LBS | HEIGHT: 67 IN | SYSTOLIC BLOOD PRESSURE: 122 MMHG

## 2021-08-20 DIAGNOSIS — Z01.419 WOMEN'S ANNUAL ROUTINE GYNECOLOGICAL EXAMINATION: Primary | ICD-10-CM

## 2021-08-20 PROCEDURE — 99396 PREV VISIT EST AGE 40-64: CPT | Performed by: OBSTETRICS & GYNECOLOGY

## 2021-08-20 RX ORDER — TOPIRAMATE 100 MG/1
100 TABLET, FILM COATED ORAL 2 TIMES DAILY
COMMUNITY
Start: 2021-06-23

## 2021-08-20 RX ORDER — LEVOTHYROXINE SODIUM 137 UG/1
137 TABLET ORAL DAILY
COMMUNITY
Start: 2021-06-16

## 2021-08-20 RX ORDER — FINASTERIDE 5 MG/1
TABLET, FILM COATED ORAL
COMMUNITY
Start: 2021-07-21 | End: 2022-10-04

## 2021-08-20 RX ORDER — BUPROPION HYDROCHLORIDE 100 MG/1
100 TABLET, EXTENDED RELEASE ORAL 2 TIMES DAILY
COMMUNITY
Start: 2021-06-29

## 2021-08-20 RX ORDER — LAMOTRIGINE 100 MG/1
100 TABLET ORAL DAILY
COMMUNITY
Start: 2021-06-23 | End: 2022-10-04 | Stop reason: DRUGHIGH

## 2021-08-20 RX ORDER — ZIPRASIDONE HYDROCHLORIDE 60 MG/1
CAPSULE ORAL
COMMUNITY
Start: 2021-08-13 | End: 2022-10-04

## 2021-08-20 RX ORDER — MEDROXYPROGESTERONE ACETATE 10 MG/1
10 TABLET ORAL DAILY
Qty: 90 TABLET | Refills: 3 | Status: SHIPPED | OUTPATIENT
Start: 2021-08-20 | End: 2022-09-12 | Stop reason: SDUPTHER

## 2021-08-20 NOTE — PROGRESS NOTES
GYN Annual Exam     CC - Here for annual exam.        HPI  Soumya Howe is a 50 y.o. female, , who presents for annual well woman exam. No LMP recorded. Patient has had an ablation..  Periods are absent .  Dysmenorrhea:none.  Patient reports problems with: hair thinning. She believes this is due to a gap in her getting her provera. There were no changes to her medical or surgical history since her last visit.. Partner Status: Marital Status: .  New Partners since last visit: no.  Desires STD Screening: no.    Additional OB/GYN History   Current contraception: contraceptive methods: ablation  Desires to: do not start contraception  Last Pap :   Last Completed Pap Smear          Ordered - PAP SMEAR (Every 3 Years) Ordered on 2020  Done - reflex; negative              History of abnormal Pap smear: yes - repeats normal  Family history of uterine, colon, breast, or ovarian cancer: yes - PGM- breast cancer  Performs monthly Self-Breast Exam: yes  Last mammogram:   Last Completed Mammogram     This patient has no relevant Health Maintenance data.         Exercises Regularly: yes  Feelings of Anxiety or Depression: yes - Takes provera qd Tobacco Usage?: No   OB History        3    Para   2    Term   2            AB   1    Living           SAB   1    TAB        Ectopic        Molar        Multiple        Live Births                    Health Maintenance   Topic Date Due   • Annual Gynecologic Pelvic and Breast Exam  Never done   • COLORECTAL CANCER SCREENING  Never done   • ANNUAL PHYSICAL  Never done   • TDAP/TD VACCINES (1 - Tdap) Never done   • HEPATITIS C SCREENING  Never done   • INFLUENZA VACCINE  10/01/2021   • PAP SMEAR  2023   • COVID-19 Vaccine  Completed   • Pneumococcal Vaccine 0-64  Aged Out       The additional following portions of the patient's history were reviewed and updated as appropriate: allergies, current medications, past family history,  "past medical history, past social history and past surgical history.    Review of Systems   Constitutional: Negative.    HENT: Negative.    Eyes: Negative.    Respiratory: Negative.    Cardiovascular: Negative.    Gastrointestinal: Negative.    Endocrine: Negative.    Genitourinary: Negative.    Musculoskeletal: Negative.    Skin: Negative.    Allergic/Immunologic: Negative.    Neurological: Negative.    Hematological: Negative.    Psychiatric/Behavioral: Negative.          I have reviewed and agree with the HPI, ROS, and historical information as entered above. Dio Cheatham MD    Objective   /80   Ht 170.2 cm (67\")   Wt 91.7 kg (202 lb 3.2 oz)   BMI 31.67 kg/m²     Physical Exam  Vitals and nursing note reviewed. Exam conducted with a chaperone present.   Constitutional:       Appearance: She is well-developed.   HENT:      Head: Normocephalic and atraumatic.   Neck:      Thyroid: No thyroid mass or thyromegaly.   Cardiovascular:      Rate and Rhythm: Normal rate and regular rhythm.      Heart sounds: No murmur heard.     Pulmonary:      Effort: Pulmonary effort is normal. No retractions.      Breath sounds: Normal breath sounds. No wheezing, rhonchi or rales.   Chest:      Chest wall: No mass or tenderness.      Breasts:         Right: Normal. No mass, nipple discharge, skin change or tenderness.         Left: Normal. No mass, nipple discharge, skin change or tenderness.   Abdominal:      General: Bowel sounds are normal.      Palpations: Abdomen is soft. Abdomen is not rigid. There is no mass.      Tenderness: There is no abdominal tenderness. There is no guarding.      Hernia: No hernia is present. There is no hernia in the left inguinal area.   Genitourinary:     Labia:         Right: No rash, tenderness or lesion.         Left: No rash, tenderness or lesion.       Vagina: Normal. No vaginal discharge or lesions.      Cervix: No cervical motion tenderness, discharge, lesion or cervical bleeding.      " Uterus: Normal. Not enlarged, not fixed and not tender.       Adnexa:         Right: No mass or tenderness.          Left: No mass or tenderness.        Rectum: No external hemorrhoid.   Musculoskeletal:      Cervical back: Normal range of motion. No muscular tenderness.   Neurological:      Mental Status: She is alert and oriented to person, place, and time.   Psychiatric:         Behavior: Behavior normal.            Assessment and Plan    Problem List Items Addressed This Visit     None      Visit Diagnoses     Women's annual routine gynecological examination    -  Primary    Relevant Orders    IGP, Rfx Aptima HPV ASCU          1. GYN annual well woman exam.   2. Reviewed monthly self breast exams.  Instructed to call with lumps, pain, or breast discharge.    3. Reviewed BMI and weight loss as preventative health measures.   4. Reviewed exercise as a preventative health measures.   5. Reccommended Flu Vaccine in Fall of each year.  6. Symptoms of menopausal transition reviewed with patient.   7. RTC in 1 year or PRN with problems.   8. cologard  9. Will continue Provera for her sx, will do FSH elsewhere    Dio Cheatham MD  08/20/2021

## 2021-08-31 DIAGNOSIS — Z01.419 WOMEN'S ANNUAL ROUTINE GYNECOLOGICAL EXAMINATION: ICD-10-CM

## 2022-01-20 ENCOUNTER — TELEPHONE (OUTPATIENT)
Dept: OBSTETRICS AND GYNECOLOGY | Facility: CLINIC | Age: 52
End: 2022-01-20

## 2022-01-20 NOTE — TELEPHONE ENCOUNTER
Patient had cologuard 9/2021. Notified + results-suggest follow up colonoscopy. She is requesting results send to pcp ATT Dr. Bowie at [phone]

## 2022-05-19 ENCOUNTER — TRANSCRIBE ORDERS (OUTPATIENT)
Dept: ADMINISTRATIVE | Facility: HOSPITAL | Age: 52
End: 2022-05-19

## 2022-05-19 DIAGNOSIS — Z12.31 VISIT FOR SCREENING MAMMOGRAM: Primary | ICD-10-CM

## 2022-06-06 ENCOUNTER — HOSPITAL ENCOUNTER (OUTPATIENT)
Dept: MAMMOGRAPHY | Facility: HOSPITAL | Age: 52
Discharge: HOME | End: 2022-06-06
Admitting: FAMILY MEDICINE

## 2022-06-06 DIAGNOSIS — Z12.31 VISIT FOR SCREENING MAMMOGRAM: ICD-10-CM

## 2022-06-06 PROCEDURE — 77067 SCR MAMMO BI INCL CAD: CPT

## 2022-06-06 PROCEDURE — 77067 SCR MAMMO BI INCL CAD: CPT | Performed by: RADIOLOGY

## 2022-06-06 PROCEDURE — 77063 BREAST TOMOSYNTHESIS BI: CPT | Performed by: RADIOLOGY

## 2022-06-06 PROCEDURE — 77063 BREAST TOMOSYNTHESIS BI: CPT

## 2022-09-12 DIAGNOSIS — Z01.419 WOMEN'S ANNUAL ROUTINE GYNECOLOGICAL EXAMINATION: ICD-10-CM

## 2022-09-12 RX ORDER — MEDROXYPROGESTERONE ACETATE 10 MG/1
10 TABLET ORAL DAILY
Qty: 90 TABLET | Refills: 3 | Status: SHIPPED | OUTPATIENT
Start: 2022-09-12 | End: 2022-10-04 | Stop reason: SDUPTHER

## 2022-10-04 ENCOUNTER — OFFICE VISIT (OUTPATIENT)
Dept: OBSTETRICS AND GYNECOLOGY | Facility: CLINIC | Age: 52
End: 2022-10-04

## 2022-10-04 VITALS
SYSTOLIC BLOOD PRESSURE: 122 MMHG | WEIGHT: 208 LBS | HEIGHT: 67 IN | BODY MASS INDEX: 32.65 KG/M2 | DIASTOLIC BLOOD PRESSURE: 72 MMHG

## 2022-10-04 DIAGNOSIS — Z01.419 WOMEN'S ANNUAL ROUTINE GYNECOLOGICAL EXAMINATION: ICD-10-CM

## 2022-10-04 DIAGNOSIS — R10.2 PELVIC PAIN: ICD-10-CM

## 2022-10-04 DIAGNOSIS — L65.9 HAIR LOSS: ICD-10-CM

## 2022-10-04 DIAGNOSIS — Z01.419 WELL WOMAN EXAM WITH ROUTINE GYNECOLOGICAL EXAM: Primary | ICD-10-CM

## 2022-10-04 PROCEDURE — 99396 PREV VISIT EST AGE 40-64: CPT | Performed by: OBSTETRICS & GYNECOLOGY

## 2022-10-04 RX ORDER — LEVOTHYROXINE SODIUM 0.12 MG/1
TABLET ORAL
COMMUNITY
Start: 2022-09-22 | End: 2022-10-04

## 2022-10-04 RX ORDER — NITROFURANTOIN 25; 75 MG/1; MG/1
CAPSULE ORAL
Qty: 28 CAPSULE | Refills: 4 | Status: SHIPPED | OUTPATIENT
Start: 2022-10-04 | End: 2023-03-20 | Stop reason: SDUPTHER

## 2022-10-04 RX ORDER — MEDROXYPROGESTERONE ACETATE 10 MG/1
10 TABLET ORAL DAILY
Qty: 90 TABLET | Refills: 3 | Status: SHIPPED | OUTPATIENT
Start: 2022-10-04

## 2022-10-04 NOTE — PROGRESS NOTES
Gynecologic Annual Exam Note        GYN Annual Exam     CC - Here for annual exam.        HPI  Soumya Howe is a 51 y.o. female, , who presents for annual well woman exam as a established patient.  Pt has had an ablation. Partner Status: Marital Status: .  She is is sexually active. She has not had new partners.. STD testing recommendations have been explained to the patient and she does not desire STD testing. Pt had labs drawn in 2021 by her PCP, per Dr Davis Request. Her estradiol, FSH, and vitamin D were checked along with some additional labs that the PCP wanted done. Pt has results from these labs with her today.    Additional OB/GYN History   On HRT? Yes. Details: provera    Last Pap : 21. Results: negative. HPV: not done  Last Completed Pap Smear          Ordered - PAP SMEAR (Every 3 Years) Ordered on 10/4/2022    2021  SCANNED - PAP SMEAR    2020  Done - reflex; negative              History of abnormal Pap smear: yes - but no further treatment was necessary. pt states that she had a repeat pap 3-6 months later and it was normal.   Family history of uterine, colon, breast, or ovarian cancer: yes - paternal grandmother- breast cancer  Performs monthly Self-Breast Exam: no  Last mammogram: 22. Done at  and it was negative    Last Completed Mammogram          MAMMOGRAM (Every 2 Years) Next due on 2022  Mammo Screening Digital Tomosynthesis Bilateral With CAD    2021  Mammo Screening Digital Tomosynthesis Bilateral With CAD    2018  Outside Procedure: [facility] MAMMOGRAM SCREENING BILAT DIGITAL W CAD    2018  Mammo Screening Digital Tomosynthesis Bilateral With CAD    2017  Outside Procedure: [facility] MAMMOGRAM SCREENING BILAT DIGITAL W CAD    Only the first 5 history entries have been loaded, but more history exists.              Last colonoscopy: 2022 and is good for 10 years    Last Completed Colonoscopy     This patient  has no relevant Health Maintenance data.            Last bone density scan (DEXA): None  Exercises Regularly: no  Feelings of Anxiety or Depression: yes - being treated for it      Tobacco Usage?: No       Current Outpatient Medications:   •  acetaminophen (TYLENOL) 500 MG tablet, Take 1,000 mg by mouth Every 6 (Six) Hours As Needed for Mild Pain ., Disp: , Rfl:   •  buPROPion SR (WELLBUTRIN SR) 100 MG 12 hr tablet, Take 100 mg by mouth 2 (Two) Times a Day., Disp: , Rfl:   •  lamoTRIgine (LaMICtal) 200 MG tablet, Take 200 mg by mouth Daily., Disp: , Rfl:   •  lansoprazole (PREVACID) 30 MG capsule, Take 1 capsule by mouth Daily., Disp: , Rfl:   •  levothyroxine (SYNTHROID, LEVOTHROID) 137 MCG tablet, Take 137 mcg by mouth Daily., Disp: , Rfl:   •  lisdexamfetamine (VYVANSE) 70 MG capsule, Take 70 mg by mouth Every Morning, Disp: , Rfl:   •  Lurasidone HCl (LATUDA) 20 MG tablet tablet, Take 20 mg by mouth Daily., Disp: , Rfl:   •  medroxyPROGESTERone (Provera) 10 MG tablet, Take 1 tablet by mouth Daily., Disp: 90 tablet, Rfl: 3  •  Multiple Vitamins-Minerals (CENTRUM PO), Take 1 dose by mouth Daily., Disp: , Rfl:   •  SUMAtriptan (IMITREX) 50 MG tablet, Take 50 mg by mouth 1 (One) Time As Needed for Migraine., Disp: , Rfl:   •  temazepam (RESTORIL) 15 MG capsule, Take 15 mg by mouth At Night As Needed for Sleep., Disp: , Rfl:   •  topiramate (TOPAMAX) 100 MG tablet, Take 100 mg by mouth 2 (Two) Times a Day., Disp: , Rfl:   •  busPIRone (BUSPAR) 5 MG tablet, Take 5 mg by mouth 3 (Three) Times a Day. Takes BID, Disp: , Rfl:   •  nitrofurantoin, macrocrystal-monohydrate, (Macrobid) 100 MG capsule, Use as needed prn IC, Disp: 28 capsule, Rfl: 4    Patient is requesting refills of provera and macrobid for prophylactic use.    OB History        3    Para   2    Term   2            AB   1    Living           SAB   1    IAB        Ectopic        Molar        Multiple        Live Births                    Past  "Medical History:   Diagnosis Date   • Bipolar 1 disorder (HCC) 2016   • Disease of thyroid gland     po meds daily    • GERD (gastroesophageal reflux disease)    • Gestational diabetes    • Migraine     last migraine 20        Past Surgical History:   Procedure Laterality Date   •  SECTION      x2   • ENDOMETRIAL ABLATION     • HYSTEROSCOPY     • LAPAROSCOPIC GASTRIC BANDING     • LUMBAR DISCECTOMY Left 2020    Procedure: LUMBAR MICRODISCECTOMY LEFT L4-5;  Surgeon: Mamadou Estrada MD;  Location:  [facility];  Service: Neurosurgery;  Laterality: Left;   • LUMBAR DISCECTOMY Left 2020    Procedure: LUMBAR MICRODISCECTOMY REDO L4-5 LEFT;  Surgeon: Mamadou Estrada MD;  Location:  [facility];  Service: Neurosurgery;  Laterality: Left;   • WISDOM TOOTH EXTRACTION         Health Maintenance   Topic Date Due   • COLORECTAL CANCER SCREENING  Never done   • ANNUAL PHYSICAL  Never done   • TDAP/TD VACCINES (1 - Tdap) Never done   • HEPATITIS C SCREENING  Never done   • ZOSTER VACCINE (1 of 2) Never done   • INFLUENZA VACCINE  Never done   • Annual Gynecologic Pelvic and Breast Exam  2022   • COVID-19 Vaccine (5 - Booster) 2022   • MAMMOGRAM  2024   • PAP SMEAR  2024   • Pneumococcal Vaccine 0-64  Aged Out       The additional following portions of the patient's history were reviewed and updated as appropriate: allergies, current medications, past family history, past medical history, past social history and past surgical history.    Review of Systems    I have reviewed and agree with the HPI, ROS, and historical information as entered above. Doi Cheatham MD    Objective   /72   Ht 170.2 cm (67\")   Wt 94.3 kg (208 lb)   Breastfeeding No   BMI 32.58 kg/m²     Physical Exam  Vitals and nursing note reviewed. Exam conducted with a chaperone present.   Constitutional:       Appearance: She is well-developed.   HENT:      Head: Normocephalic and atraumatic.   Neck: "      Thyroid: No thyroid mass or thyromegaly.   Cardiovascular:      Rate and Rhythm: Normal rate and regular rhythm.      Heart sounds: No murmur heard.  Pulmonary:      Effort: Pulmonary effort is normal. No retractions.      Breath sounds: Normal breath sounds. No wheezing, rhonchi or rales.   Chest:      Chest wall: No mass or tenderness.   Breasts:      Right: Normal. No mass, nipple discharge, skin change or tenderness.      Left: Normal. No mass, nipple discharge, skin change or tenderness.       Abdominal:      General: Bowel sounds are normal.      Palpations: Abdomen is soft. Abdomen is not rigid. There is no mass.      Tenderness: There is no abdominal tenderness. There is no guarding.      Hernia: No hernia is present. There is no hernia in the left inguinal area.   Genitourinary:     Labia:         Right: No rash, tenderness or lesion.         Left: No rash, tenderness or lesion.       Vagina: Normal. No vaginal discharge or lesions.      Cervix: No cervical motion tenderness, discharge, lesion or cervical bleeding.      Uterus: Normal. Not enlarged, not fixed and not tender.       Adnexa:         Right: No mass or tenderness.          Left: No mass or tenderness.        Rectum: No external hemorrhoid.   Musculoskeletal:      Cervical back: Normal range of motion. No muscular tenderness.   Neurological:      Mental Status: She is alert and oriented to person, place, and time.   Psychiatric:         Behavior: Behavior normal.            Assessment and Plan    Problem List Items Addressed This Visit    None     Visit Diagnoses     Well woman exam with routine gynecological exam    -  Primary    Relevant Medications    nitrofurantoin, macrocrystal-monohydrate, (Macrobid) 100 MG capsule    Other Relevant Orders    LIQUID-BASED PAP SMEAR, [facility] ([facility])    Women's annual routine gynecological examination        Relevant Medications    medroxyPROGESTERone (Provera) 10 MG tablet    Pelvic pain         Hair loss          provera helps hair loss   No periods since her ablation   1. GYN annual well woman exam.   2.   3. Return in about 1 year (around 10/4/2023) for Annual physical.     Dio Cheatham MD  10/04/2022

## 2022-10-06 LAB — REF LAB TEST METHOD: NORMAL

## 2023-03-20 DIAGNOSIS — Z01.419 WELL WOMAN EXAM WITH ROUTINE GYNECOLOGICAL EXAM: ICD-10-CM

## 2023-03-20 RX ORDER — NITROFURANTOIN 25; 75 MG/1; MG/1
CAPSULE ORAL
Qty: 28 CAPSULE | Refills: 4 | Status: SHIPPED | OUTPATIENT
Start: 2023-03-20

## 2023-10-11 DIAGNOSIS — Z01.419 WOMEN'S ANNUAL ROUTINE GYNECOLOGICAL EXAMINATION: ICD-10-CM

## 2023-10-11 RX ORDER — MEDROXYPROGESTERONE ACETATE 10 MG/1
10 TABLET ORAL DAILY
Qty: 90 TABLET | Refills: 1 | Status: SHIPPED | OUTPATIENT
Start: 2023-10-11

## 2023-11-14 ENCOUNTER — OFFICE VISIT (OUTPATIENT)
Dept: OBSTETRICS AND GYNECOLOGY | Facility: CLINIC | Age: 53
End: 2023-11-14
Payer: COMMERCIAL

## 2023-11-14 VITALS
SYSTOLIC BLOOD PRESSURE: 110 MMHG | WEIGHT: 214 LBS | HEIGHT: 67 IN | BODY MASS INDEX: 33.59 KG/M2 | DIASTOLIC BLOOD PRESSURE: 80 MMHG

## 2023-11-14 DIAGNOSIS — N95.2 ATROPHIC VAGINITIS: Primary | ICD-10-CM

## 2023-11-14 DIAGNOSIS — Z01.419 WOMEN'S ANNUAL ROUTINE GYNECOLOGICAL EXAMINATION: ICD-10-CM

## 2023-11-14 RX ORDER — MEDROXYPROGESTERONE ACETATE 10 MG/1
10 TABLET ORAL DAILY
Qty: 90 TABLET | Refills: 3 | Status: SHIPPED | OUTPATIENT
Start: 2023-11-14

## 2023-11-14 RX ORDER — ESTRADIOL 0.1 MG/G
CREAM VAGINAL
Qty: 42.5 G | Refills: 4 | Status: SHIPPED | OUTPATIENT
Start: 2023-11-14

## 2023-11-14 NOTE — PROGRESS NOTES
Gynecologic Annual Exam Note        GYN Annual Exam     CC - Here for annual exam.        HPI  Soumya Howe is a 52 y.o. female, , who presents for annual well woman exam as a established patient. Who has had an ablation.  Patient reports problems with: vaginal dryness. There were no changes to her medical or surgical history since her last visit.. Partner Status: Marital Status: .  She is is sexually active. She has not had new partners.. STD testing recommendations have been explained to the patient and she does not desire STD testing.    Additional OB/GYN History   On HRT? Yes. Details: estrogen     Last Pap : 10/04/2022. Results: negative. HPV: negative.   Last Completed Pap Smear            PAP SMEAR (Every 3 Years) Next due on 10/4/2025      10/04/2022  LIQUID-BASED PAP SMEAR, [facility] ([facility])    2021  SCANNED - PAP SMEAR    2020  Done - reflex; negative                  History of abnormal Pap smear: yes - was repeated and fine  Family history of uterine, colon, breast, or ovarian cancer: yes - PGM breast   Performs monthly Self-Breast Exam: no  Last mammogram: 2022. Done at [facility].    Last Completed Mammogram            MAMMOGRAM (Every 2 Years) Next due on 2022  Mammo Screening Digital Tomosynthesis Bilateral With CAD    2021  Mammo Screening Digital Tomosynthesis Bilateral With CAD    2018  Mammo Screening Digital Tomosynthesis Bilateral With CAD    2018  Outside Procedure: [facility] MAMMOGRAM SCREENING BILAT DIGITAL W CAD    2017  Outside Procedure: [facility] MAMMOGRAM SCREENING BILAT DIGITAL W CAD    Only the first 5 history entries have been loaded, but more history exists.                  Last colonoscopy: has had a colonoscopy 1.5 year(s) ago.    Last Completed Colonoscopy            COLORECTAL CANCER SCREENING (COLOGUARD - Every 3 Years) Next due on 2021  Cologuard - Stool, Per Rectum                       Last bone density scan (DEXA): None  Exercises Regularly: no  Feelings of Anxiety or Depression: yes - medication      Tobacco Usage?: No       Current Outpatient Medications:     acetaminophen (TYLENOL) 500 MG tablet, Take 2 tablets by mouth Every 6 (Six) Hours As Needed for Mild Pain., Disp: , Rfl:     buPROPion SR (WELLBUTRIN SR) 100 MG 12 hr tablet, Take 1 tablet by mouth 2 (Two) Times a Day., Disp: , Rfl:     busPIRone (BUSPAR) 5 MG tablet, Take 1 tablet by mouth 3 (Three) Times a Day. Takes BID, Disp: , Rfl:     lamoTRIgine (LaMICtal) 200 MG tablet, Take 1 tablet by mouth Daily., Disp: , Rfl:     lansoprazole (PREVACID) 30 MG capsule, Take 1 capsule by mouth Daily., Disp: , Rfl:     levothyroxine (SYNTHROID, LEVOTHROID) 137 MCG tablet, Take 1 tablet by mouth Daily., Disp: , Rfl:     lisdexamfetamine (VYVANSE) 70 MG capsule, Take 1 capsule by mouth Every Morning, Disp: , Rfl:     Lurasidone HCl (LATUDA) 20 MG tablet tablet, Take 1 tablet by mouth Daily., Disp: , Rfl:     medroxyPROGESTERone (Provera) 10 MG tablet, Take 1 tablet by mouth Daily., Disp: 90 tablet, Rfl: 3    Multiple Vitamins-Minerals (CENTRUM PO), Take 1 dose by mouth Daily., Disp: , Rfl:     SUMAtriptan (IMITREX) 50 MG tablet, Take 1 tablet by mouth 1 (One) Time As Needed for Migraine., Disp: , Rfl:     topiramate (TOPAMAX) 100 MG tablet, Take 1 tablet by mouth 2 (Two) Times a Day., Disp: , Rfl:     estradiol (ESTRACE VAGINAL) 0.1 MG/GM vaginal cream, 2 gms vaginally qhs x 3 weeks then 3x a week, Disp: 42.5 g, Rfl: 4    nitrofurantoin, macrocrystal-monohydrate, (Macrobid) 100 MG capsule, Use as needed prn IC, Disp: 28 capsule, Rfl: 4    temazepam (RESTORIL) 15 MG capsule, Take 1 capsule by mouth At Night As Needed for Sleep., Disp: , Rfl:     Patient is requesting refills of estrogen.    OB History          3    Para   2    Term   2            AB   1    Living             SAB   1    IAB        Ectopic        Molar      "   Multiple        Live Births                    Past Medical History:   Diagnosis Date    Bipolar 1 disorder 2016    Disease of thyroid gland     po meds daily     GERD (gastroesophageal reflux disease)     Gestational diabetes     Migraine     last migraine 20        Past Surgical History:   Procedure Laterality Date     SECTION      x2    ENDOMETRIAL ABLATION      HYSTEROSCOPY      LAPAROSCOPIC GASTRIC BANDING  2007    LUMBAR DISCECTOMY Left 2020    Procedure: LUMBAR MICRODISCECTOMY LEFT L4-5;  Surgeon: Mamadou Estrada MD;  Location:  [facility];  Service: Neurosurgery;  Laterality: Left;    LUMBAR DISCECTOMY Left 2020    Procedure: LUMBAR MICRODISCECTOMY REDO L4-5 LEFT;  Surgeon: Mamadou Estrada MD;  Location:  [facility];  Service: Neurosurgery;  Laterality: Left;    WISDOM TOOTH EXTRACTION         Health Maintenance   Topic Date Due    TDAP/TD VACCINES (1 - Tdap) Never done    HEPATITIS C SCREENING  Never done    ANNUAL PHYSICAL  Never done    ZOSTER VACCINE (1 of 2) Never done    BMI FOLLOWUP  2021    INFLUENZA VACCINE  Never done    COVID-19 Vaccine ( - - season) 2023    Annual Gynecologic Pelvic and Breast Exam  10/05/2023    MAMMOGRAM  2024    COLORECTAL CANCER SCREENING  2024    PAP SMEAR  10/04/2025    Pneumococcal Vaccine 0-64  Aged Out       The additional following portions of the patient's history were reviewed and updated as appropriate: allergies, current medications, past family history, past medical history, past social history, past surgical history, and problem list.    Review of Systems    I have reviewed and agree with the HPI, ROS, and historical information as entered above. Dio Cheatham MD      Objective   /80   Ht 170.2 cm (67\")   Wt 97.1 kg (214 lb)   BMI 33.52 kg/m²     Physical Exam  Vitals and nursing note reviewed. Exam conducted with a chaperone present.   Constitutional:       Appearance: She is well-developed. "   HENT:      Head: Normocephalic and atraumatic.   Neck:      Thyroid: No thyroid mass or thyromegaly.   Cardiovascular:      Rate and Rhythm: Normal rate and regular rhythm.      Heart sounds: No murmur heard.  Pulmonary:      Effort: Pulmonary effort is normal. No retractions.      Breath sounds: Normal breath sounds. No wheezing, rhonchi or rales.   Chest:      Chest wall: No mass or tenderness.   Breasts:     Right: Normal. No mass, nipple discharge, skin change or tenderness.      Left: Normal. No mass, nipple discharge, skin change or tenderness.   Abdominal:      General: Bowel sounds are normal.      Palpations: Abdomen is soft. Abdomen is not rigid. There is no mass.      Tenderness: There is no abdominal tenderness. There is no guarding.      Hernia: No hernia is present. There is no hernia in the left inguinal area.   Genitourinary:     Labia:         Right: No rash, tenderness or lesion.         Left: No rash, tenderness or lesion.       Vagina: Normal. No vaginal discharge or lesions.      Cervix: No cervical motion tenderness, discharge, lesion or cervical bleeding.      Uterus: Normal. Not enlarged, not fixed and not tender.       Adnexa:         Right: No mass or tenderness.          Left: No mass or tenderness.        Rectum: No external hemorrhoid.   Musculoskeletal:      Cervical back: Normal range of motion. No muscular tenderness.   Neurological:      Mental Status: She is alert and oriented to person, place, and time.   Psychiatric:         Behavior: Behavior normal.            Assessment and Plan    Problem List Items Addressed This Visit    None  Visit Diagnoses       Atrophic vaginitis    -  Primary    Women's annual routine gynecological examination        Relevant Medications    medroxyPROGESTERone (Provera) 10 MG tablet        No pap     GYN annual well woman exam.   Reviewed monthly self breast exams.  Instructed to call with lumps, pain, or breast discharge.  Yearly mammograms  ordered.  Ordered mammogram today.  Reviewed BMI and weight loss as preventative health measures.   Colonoscopy recommended.  Symptoms of menopausal transition reviewed with patient.  Reviewed risks/benefits of OTC, non-hormonal and hormonal treatment for vasomotor and other menopausal symptoms.  Other: atrophic vaginitis   Return for Annual physical.     Dio Cheatham MD  11/14/2023

## 2024-06-27 ENCOUNTER — TRANSCRIBE ORDERS (OUTPATIENT)
Dept: ADMINISTRATIVE | Facility: HOSPITAL | Age: 54
End: 2024-06-27
Payer: COMMERCIAL

## 2024-06-27 ENCOUNTER — HOSPITAL ENCOUNTER (OUTPATIENT)
Dept: MAMMOGRAPHY | Facility: HOSPITAL | Age: 54
Discharge: HOME | End: 2024-06-27
Admitting: INTERNAL MEDICINE
Payer: COMMERCIAL

## 2024-06-27 DIAGNOSIS — Z12.31 ENCOUNTER FOR SCREENING MAMMOGRAM FOR BREAST CANCER: ICD-10-CM

## 2024-06-27 DIAGNOSIS — Z12.31 ENCOUNTER FOR SCREENING MAMMOGRAM FOR BREAST CANCER: Primary | ICD-10-CM

## 2024-06-27 PROCEDURE — 77063 BREAST TOMOSYNTHESIS BI: CPT

## 2024-06-27 PROCEDURE — 77067 SCR MAMMO BI INCL CAD: CPT

## 2024-11-23 DIAGNOSIS — Z01.419 WOMEN'S ANNUAL ROUTINE GYNECOLOGICAL EXAMINATION: ICD-10-CM

## 2024-11-25 RX ORDER — MEDROXYPROGESTERONE ACETATE 10 MG
10 TABLET ORAL DAILY
Qty: 90 TABLET | Refills: 0 | Status: SHIPPED | OUTPATIENT
Start: 2024-11-25

## 2025-03-01 DIAGNOSIS — Z01.419 WOMEN'S ANNUAL ROUTINE GYNECOLOGICAL EXAMINATION: ICD-10-CM

## 2025-03-03 RX ORDER — MEDROXYPROGESTERONE ACETATE 10 MG
10 TABLET ORAL DAILY
Qty: 90 TABLET | Refills: 0 | Status: SHIPPED | OUTPATIENT
Start: 2025-03-03

## 2025-04-08 ENCOUNTER — OFFICE VISIT (OUTPATIENT)
Dept: OBSTETRICS AND GYNECOLOGY | Facility: CLINIC | Age: 55
End: 2025-04-08
Payer: COMMERCIAL

## 2025-04-08 VITALS
HEIGHT: 67 IN | DIASTOLIC BLOOD PRESSURE: 80 MMHG | SYSTOLIC BLOOD PRESSURE: 116 MMHG | WEIGHT: 199.6 LBS | BODY MASS INDEX: 31.33 KG/M2

## 2025-04-08 DIAGNOSIS — N95.2 ATROPHIC VAGINITIS: Primary | ICD-10-CM

## 2025-04-08 DIAGNOSIS — Z01.419 WOMEN'S ANNUAL ROUTINE GYNECOLOGICAL EXAMINATION: ICD-10-CM

## 2025-04-08 RX ORDER — FLUOXETINE 10 MG/1
CAPSULE ORAL
COMMUNITY
Start: 2025-03-31

## 2025-04-08 RX ORDER — CITRULL/ARGIN/PINE XT/ROSE HIP 400-400 MG
1 TABLET ORAL DAILY
Qty: 10 SUPPOSITORY | Refills: 12 | Status: SHIPPED | OUTPATIENT
Start: 2025-04-08

## 2025-04-08 RX ORDER — PROGESTERONE 100 MG/1
100 CAPSULE ORAL DAILY
Qty: 30 CAPSULE | Refills: 11 | Status: SHIPPED | OUTPATIENT
Start: 2025-04-08

## 2025-04-08 RX ORDER — ESTRADIOL 0.1 MG/G
CREAM VAGINAL
Qty: 42.5 G | Refills: 4 | Status: SHIPPED | OUTPATIENT
Start: 2025-04-08

## 2025-04-08 NOTE — PROGRESS NOTES
Gynecologic Annual Exam Note        GYN Annual Exam     CC - Here for annual exam.        HPI  Soumya Howe is a 54 y.o. female, , who presents for annual well woman exam as a established patient.  She has had an ablation. Denies vaginal bleeding.   There were no changes to her medical or surgical history since her last visit. Marital Status: .  She is sexually active. She has not had new partners.. STD testing recommendations have been explained to the patient and she declines STD testing.    The patient would like to discuss the following complaints today: Would like to discuss changing from Estrace cream to something else. Patient states she is still having issues with vaginal dryness and feels that the cream is not improving her symptoms.     Additional OB/GYN History   On HRT? Yes. Details: Medroxyprogesterone     Last Pap : 10/04/2022. Results: negative. HPV: negative.   Last Completed Pap Smear            Awaiting Completion       PAP SMEAR (Every 3 Years) Order placed this encounter      2025  Order placed for LIQUID-BASED PAP SMEAR WITH HPV GENOTYPING REGARDLESS OF INTERPRETATION ([facility]) by Dio Cheatham MD    10/04/2022  LIQUID-BASED PAP SMEAR, [facility] ([facility])    2021  SCANNED - PAP SMEAR    2020  Done - reflex; negative                          History of abnormal Pap smear: no  Family history of uterine, colon, breast, or ovarian cancer: yes - PGM had breast cancer  Performs monthly Self-Breast Exam: yes  Last mammogram: 2024. Done at . There is a copy in the chart.    Last Completed Mammogram            Upcoming       MAMMOGRAM (Every 2 Years) Next due on 2024  Mammo Screening Digital Tomosynthesis Bilateral With CAD    2022  Mammo Screening Digital Tomosynthesis Bilateral With CAD    2021  Mammo Screening Digital Tomosynthesis Bilateral With CAD    2018  Mammo Screening Digital Tomosynthesis  Bilateral With CAD    2018  Outside Procedure: [facility] MAMMOGRAM SCREENING BILAT DIGITAL W CAD     Only the first 5 history entries have been loaded, but more history exists.                        Last colonoscopy: has had a cologuard 2021 -Abnormal    Last Completed Colonoscopy    This patient has no relevant Health Maintenance data.         She has never had a bone density scan  Exercises Regularly: no  Feelings of Anxiety or Depression: yes - Currently managed with medication      Tobacco Usage?: No       Current Outpatient Medications:     buPROPion SR (WELLBUTRIN SR) 100 MG 12 hr tablet, Take 1 tablet by mouth 2 (Two) Times a Day., Disp: , Rfl:     busPIRone (BUSPAR) 5 MG tablet, Take 1 tablet by mouth 3 (Three) Times a Day. Takes BID, Disp: , Rfl:     estradiol (ESTRACE VAGINAL) 0.1 MG/GM vaginal cream, 2 gms vaginally qhs x 3 weeks then 3x a week, Disp: 42.5 g, Rfl: 4    FLUoxetine (PROzac) 10 MG capsule, , Disp: , Rfl:     lamoTRIgine (LaMICtal) 200 MG tablet, Take 1 tablet by mouth Daily., Disp: , Rfl:     lansoprazole (PREVACID) 30 MG capsule, Take 1 capsule by mouth Daily., Disp: , Rfl:     levothyroxine (SYNTHROID, LEVOTHROID) 137 MCG tablet, Take 1 tablet by mouth Daily., Disp: , Rfl:     Lurasidone HCl (LATUDA) 20 MG tablet tablet, Take 1 tablet by mouth Daily., Disp: , Rfl:     Multiple Vitamins-Minerals (CENTRUM PO), Take 1 dose by mouth Daily., Disp: , Rfl:     SUMAtriptan (IMITREX) 50 MG tablet, Take 1 tablet by mouth 1 (One) Time As Needed for Migraine., Disp: , Rfl:     topiramate (TOPAMAX) 100 MG tablet, Take 1 tablet by mouth 2 (Two) Times a Day., Disp: , Rfl:     Progesterone (Prometrium) 100 MG capsule, Take 1 capsule by mouth Daily., Disp: 30 capsule, Rfl: 11    Vaginal Lubricant (Revaree) 0.25 % suppository, Insert 1 suppository into the vagina Daily. 1 supp q 3 nights, Disp: 10 suppository, Rfl: 12    Patient is requesting refills of Provera .    OB History          3     "Para   2    Term   2            AB   1    Living             SAB   1    IAB        Ectopic        Molar        Multiple        Live Births   2                Past Medical History:   Diagnosis Date    Bipolar 1 disorder 2016    Disease of thyroid gland     po meds daily     GERD (gastroesophageal reflux disease)     Gestational diabetes     Migraine     last migraine 20        Past Surgical History:   Procedure Laterality Date     SECTION      x2    ENDOMETRIAL ABLATION      HYSTEROSCOPY      LAPAROSCOPIC GASTRIC BANDING  2007    LUMBAR DISCECTOMY Left 2020    Procedure: LUMBAR MICRODISCECTOMY LEFT L4-5;  Surgeon: Mamadou Estrada MD;  Location:  [facility];  Service: Neurosurgery;  Laterality: Left;    LUMBAR DISCECTOMY Left 2020    Procedure: LUMBAR MICRODISCECTOMY REDO L4-5 LEFT;  Surgeon: Mamadou Estrada MD;  Location:  [facility];  Service: Neurosurgery;  Laterality: Left;    WISDOM TOOTH EXTRACTION         Health Maintenance   Topic Date Due    TDAP/TD VACCINES (1 - Tdap) Never done    HEPATITIS C SCREENING  Never done    ANNUAL PHYSICAL  Never done    Pneumococcal Vaccine 50+ (1 of 1 - PCV) Never done    ZOSTER VACCINE (1 of 2) Never done    COVID-19 Vaccine ( - - season) 2024    COLORECTAL CANCER SCREENING  2024    Annual Gynecologic Pelvic and Breast Exam  11/15/2024    INFLUENZA VACCINE  2025    PAP SMEAR  10/04/2025    MAMMOGRAM  2026       The additional following portions of the patient's history were reviewed and updated as appropriate: allergies, current medications, past family history, past medical history, past social history, past surgical history, and problem list.    Review of Systems   All other systems reviewed and are negative.      I have reviewed and agree with the HPI, ROS, and historical information as entered above. Dio Cheatham MD      Objective   /80   Ht 170.2 cm (67.01\")   Wt 90.5 kg (199 lb 9.6 oz)   BMI " 31.25 kg/m²     Physical Exam  Vitals and nursing note reviewed. Exam conducted with a chaperone present.   Constitutional:       Appearance: She is well-developed.   HENT:      Head: Normocephalic and atraumatic.   Neck:      Thyroid: No thyroid mass or thyromegaly.   Cardiovascular:      Rate and Rhythm: Normal rate and regular rhythm.      Heart sounds: No murmur heard.  Pulmonary:      Effort: Pulmonary effort is normal. No retractions.      Breath sounds: Normal breath sounds. No wheezing, rhonchi or rales.   Chest:      Chest wall: No mass or tenderness.   Breasts:     Right: Normal. No mass, nipple discharge, skin change or tenderness.      Left: Normal. No mass, nipple discharge, skin change or tenderness.   Abdominal:      General: Bowel sounds are normal.      Palpations: Abdomen is soft. Abdomen is not rigid. There is no mass.      Tenderness: There is no abdominal tenderness. There is no guarding.      Hernia: No hernia is present. There is no hernia in the left inguinal area.   Genitourinary:     Labia:         Right: No rash, tenderness or lesion.         Left: No rash, tenderness or lesion.       Vagina: Normal. No vaginal discharge or lesions.      Cervix: No cervical motion tenderness, discharge, lesion or cervical bleeding.      Uterus: Normal. Not enlarged, not fixed and not tender.       Adnexa:         Right: No mass or tenderness.          Left: No mass or tenderness.        Rectum: No external hemorrhoid.   Musculoskeletal:      Cervical back: Normal range of motion. No muscular tenderness.   Neurological:      Mental Status: She is alert and oriented to person, place, and time.   Psychiatric:         Behavior: Behavior normal.            Assessment and Plan    Problem List Items Addressed This Visit    None  Visit Diagnoses         Atrophic vaginitis    -  Primary    Relevant Medications    Progesterone (Prometrium) 100 MG capsule    estradiol (ESTRACE VAGINAL) 0.1 MG/GM vaginal cream     Other Relevant Orders    LIQUID-BASED PAP SMEAR WITH HPV GENOTYPING REGARDLESS OF INTERPRETATION ([facility])      Women's annual routine gynecological examination              Takes Prog for hair loss and we will ch to Prometrium and add revaree for Dysp, estrace x 2 weeks then 3x a week , Revaree q 3 nights   GYN annual well woman exam.   Reviewed monthly self breast exams.  Instructed to call with lumps, pain, or breast discharge.  Yearly mammograms ordered.  Ordered mammogram today.  Recommended use of Vitamin D and getting adequate calcium in her diet. (1500mg)  Reviewed exercise as a preventative health measures.   Reviewed BMI and weight loss as preventative health measures.   Colonoscopy recommended.  Reviewed risks of ERT including increased risk of breast cancer, increased blood clots, increased heart disease.  Patient strongly desires to stay on or start ERT.  She understands we will use the lowest amount that adequately controls her symptoms.  RTC in 1 year or PRN with problems.  Other: Dyspareunia is a problem   No follow-ups on file.         Dio Cheatham MD  04/08/2025

## 2025-04-09 LAB — REF LAB TEST METHOD: NORMAL

## 2025-06-05 ENCOUNTER — TELEPHONE (OUTPATIENT)
Dept: OBSTETRICS AND GYNECOLOGY | Facility: CLINIC | Age: 55
End: 2025-06-05
Payer: COMMERCIAL

## 2025-06-05 NOTE — TELEPHONE ENCOUNTER
Patient called the office. She states she saw Dr Cheatham in April and she normally takes medroxyprogesterone due to having hair loss once she stops taking it. She states they discussed her continuing it but he did not send in a new Rx. Her pharmacy says it was discontinued. Advised patient that I would have to look into this and reach out to Dr Cheatham. Upon further reviewing the patients chart and her last office visit note on 4/8/25, Dr Cheatham mentioned in his note that the patient uses medroxyprogesterone for hair loss and he plans to switch to Prometrium. I called the patient and explained to her that Prometrium is another form of progesterone and he likely switched to Prometrium because it is safer to use long term and has less risks and side effects associated with it.  She VU. Advised not to take both the progesterone medications together as she had been. She will continue the Prometrium only. She RAUL.         -Ian Dugan

## 2025-06-06 DIAGNOSIS — N95.2 ATROPHIC VAGINITIS: ICD-10-CM

## 2025-06-06 RX ORDER — PROGESTERONE 100 MG/1
100 CAPSULE ORAL DAILY
Qty: 30 CAPSULE | Refills: 11 | Status: SHIPPED | OUTPATIENT
Start: 2025-06-06

## 2025-07-03 ENCOUNTER — HOSPITAL ENCOUNTER (OUTPATIENT)
Dept: GENERAL RADIOLOGY | Facility: HOSPITAL | Age: 55
Discharge: HOME | End: 2025-07-03
Admitting: INTERNAL MEDICINE
Payer: COMMERCIAL

## 2025-07-03 ENCOUNTER — TRANSCRIBE ORDERS (OUTPATIENT)
Dept: GENERAL RADIOLOGY | Facility: HOSPITAL | Age: 55
End: 2025-07-03
Payer: COMMERCIAL

## 2025-07-03 DIAGNOSIS — M54.17 LUMBOSACRAL RADICULOPATHY: ICD-10-CM

## 2025-07-03 DIAGNOSIS — M54.17 LUMBOSACRAL RADICULOPATHY: Primary | ICD-10-CM

## 2025-07-03 PROCEDURE — 72114 X-RAY EXAM L-S SPINE BENDING: CPT

## 2025-07-11 ENCOUNTER — TRANSCRIBE ORDERS (OUTPATIENT)
Dept: ADMINISTRATIVE | Facility: HOSPITAL | Age: 55
End: 2025-07-11
Payer: COMMERCIAL

## 2025-07-11 DIAGNOSIS — M54.17 LUMBOSACRAL RADICULOPATHY: Primary | ICD-10-CM

## 2025-07-22 ENCOUNTER — HOSPITAL ENCOUNTER (OUTPATIENT)
Dept: MRI IMAGING | Facility: HOSPITAL | Age: 55
Discharge: HOME | End: 2025-07-22
Admitting: INTERNAL MEDICINE
Payer: COMMERCIAL

## 2025-07-22 DIAGNOSIS — M54.17 LUMBOSACRAL RADICULOPATHY: ICD-10-CM

## 2025-07-22 PROCEDURE — 72148 MRI LUMBAR SPINE W/O DYE: CPT

## 2025-08-25 ENCOUNTER — OFFICE VISIT (OUTPATIENT)
Dept: NEUROSURGERY | Facility: CLINIC | Age: 55
End: 2025-08-25
Payer: COMMERCIAL

## 2025-08-25 VITALS — BODY MASS INDEX: 29.68 KG/M2 | TEMPERATURE: 97.1 F | HEIGHT: 67 IN | WEIGHT: 189.1 LBS

## 2025-08-25 DIAGNOSIS — M54.50 CHRONIC RIGHT-SIDED LOW BACK PAIN WITHOUT SCIATICA: Primary | ICD-10-CM

## 2025-08-25 DIAGNOSIS — G89.29 CHRONIC RIGHT-SIDED LOW BACK PAIN WITHOUT SCIATICA: Primary | ICD-10-CM

## 2025-08-25 PROCEDURE — 99204 OFFICE O/P NEW MOD 45 MIN: CPT | Performed by: NEUROLOGICAL SURGERY

## 2025-08-25 RX ORDER — HYDROXYZINE HYDROCHLORIDE 25 MG/1
25 TABLET, FILM COATED ORAL 3 TIMES DAILY PRN
COMMUNITY

## 2025-08-25 RX ORDER — ROPINIROLE 0.5 MG/1
0.5 TABLET, FILM COATED ORAL NIGHTLY
COMMUNITY

## 2025-08-25 RX ORDER — FINASTERIDE 5 MG/1
5 TABLET, FILM COATED ORAL DAILY
COMMUNITY

## 2025-08-25 RX ORDER — MONTELUKAST SODIUM 4 MG/1
4 TABLET, CHEWABLE ORAL NIGHTLY
COMMUNITY

## 2025-08-25 RX ORDER — MINOXIDIL 2.5 MG/1
2.5 TABLET ORAL DAILY
COMMUNITY

## 2025-08-25 RX ORDER — DOXYCYCLINE 100 MG/1
100 CAPSULE ORAL 2 TIMES DAILY
COMMUNITY

## 2025-08-28 ENCOUNTER — PATIENT ROUNDING (BHMG ONLY) (OUTPATIENT)
Dept: NEUROSURGERY | Facility: CLINIC | Age: 55
End: 2025-08-28
Payer: COMMERCIAL

## (undated) DEVICE — ELECTRD BLD EXT EDGE/INSUL 6IN

## (undated) DEVICE — PAD GRND REM POLYHESIVE A/ DISP

## (undated) DEVICE — STRAP POSTN KN/BDY FM 5X72IN DISP

## (undated) DEVICE — CVR HNDL LIGHT RIGID

## (undated) DEVICE — DRP MICROSCOP ELIMINATES GLAS COVR

## (undated) DEVICE — NDL HYPO ECLPS SFTY 25G 1 1/2IN

## (undated) DEVICE — HDRST INTUB GENTLETOUCH SLOT 7IN RT

## (undated) DEVICE — SUT VIC 0 UR6 27IN VCP603H

## (undated) DEVICE — PAD ARMBRD SURG CONVOL 7.5X20X2IN

## (undated) DEVICE — ELECTRD BLD EDGE/INSUL1P 2.4X5.1MM STRL

## (undated) DEVICE — PK NEURO DISC 10

## (undated) DEVICE — SPNG GZ WOVN 4X4IN 12PLY 10/BX STRL

## (undated) DEVICE — RUBBERBAND LF STRL PK/2

## (undated) DEVICE — Device

## (undated) DEVICE — TOOL T12MH25M LEGEND 12CM 2.5MM MH 2FLT: Brand: MIDAS REX ™

## (undated) DEVICE — 3M™ STERI-DRAPE™ INSTRUMENT POUCH 1018: Brand: STERI-DRAPE™

## (undated) DEVICE — NEEDLE, QUINCKE 22GX3.5": Brand: MEDLINE INDUSTRIES, INC.

## (undated) DEVICE — DRSNG SURESITE123 4X4.8IN

## (undated) DEVICE — ANTIBACTERIAL UNDYED BRAIDED (POLYGLACTIN 910), SYNTHETIC ABSORBABLE SUTURE: Brand: COATED VICRYL

## (undated) DEVICE — SYR CONTRL LUERLOK 10CC

## (undated) DEVICE — 1010 S-DRAPE TOWEL DRAPE 10/BX: Brand: STERI-DRAPE™

## (undated) DEVICE — ADHS LIQ MASTISOL 2/3ML

## (undated) DEVICE — 3M™ STERI-STRIP™ REINFORCED ADHESIVE SKIN CLOSURES, R1547, 1/2 IN X 4 IN (12 MM X 100 MM), 6 STRIPS/ENVELOPE: Brand: 3M™ STERI-STRIP™

## (undated) DEVICE — GLV SURG BIOGEL LTX PF 8

## (undated) DEVICE — C-ARM: Brand: UNBRANDED

## (undated) DEVICE — DRP MICROSCOPE 4 BINOCULAR CV 54X150IN

## (undated) DEVICE — GLV SURG PREMIERPRO MIC LTX PF SZ8 BRN

## (undated) DEVICE — THE FLOSEAL MALLEABLE TIP AND TRIMMABLE TIP ARE INTENDED FOR DELIVERY OF FLOSEAL HEMOSTATIC MATRIX.: Brand: FLOSEAL SPECIAL APPLICATOR TIPS

## (undated) DEVICE — 3M™ WARMING BLANKET, UPPER BODY, 10 PER CASE, 42268: Brand: BAIR HUGGER™

## (undated) DEVICE — SCRB SURG BACTOSHIELD CHG 4PCT 4OZ